# Patient Record
Sex: MALE | Race: WHITE | NOT HISPANIC OR LATINO | Employment: FULL TIME | URBAN - METROPOLITAN AREA
[De-identification: names, ages, dates, MRNs, and addresses within clinical notes are randomized per-mention and may not be internally consistent; named-entity substitution may affect disease eponyms.]

---

## 2017-01-04 ENCOUNTER — OFFICE VISIT (OUTPATIENT)
Dept: SURGERY | Facility: CLINIC | Age: 62
End: 2017-01-04

## 2017-01-04 VITALS
HEIGHT: 69 IN | DIASTOLIC BLOOD PRESSURE: 80 MMHG | WEIGHT: 172.6 LBS | SYSTOLIC BLOOD PRESSURE: 138 MMHG | BODY MASS INDEX: 25.56 KG/M2 | RESPIRATION RATE: 20 BRPM | HEART RATE: 69 BPM | OXYGEN SATURATION: 99 %

## 2017-01-04 DIAGNOSIS — Z09 FOLLOW UP: Primary | ICD-10-CM

## 2017-01-04 PROCEDURE — 99024 POSTOP FOLLOW-UP VISIT: CPT | Performed by: SURGERY

## 2017-01-04 NOTE — LETTER
January 4, 2017     Carlos Wu MD  [address] [address]    Patient: Adonis Tavares   YOB: 1955   Date of Visit: 1/4/2017       Dear Dr. Jazmin MD:    Thank you for referring Adonis Tavares to me for evaluation. Below are the relevant portions of my assessment and plan of care.                   If you have questions, please do not hesitate to call me. I look forward to following Adonis along with you.         Sincerely,        Simona Pena MD        CC: No Recipients

## 2017-01-04 NOTE — MR AVS SNAPSHOT
"                        Adonis Tavares   1/4/2017 11:20 AM   Office Visit    Dept Phone:  [phone]   Encounter #:  [id number]    Provider:  Simona Pena MD   Department:  [facility] GENERAL SURGERY                Your Full Care Plan              Your Updated Medication List      Notice  As of 1/4/2017 12:58 PM    You have not been prescribed any medications.            Instructions     None    Patient Instructions History      Upcoming Appointments     Visit Type Date Time Department    POST-OP 1/4/2017 11:20 AM [facility]      MyChart Signup     Our records indicate that you have declined [facility] [employer] signup. If you would like to sign up for [employer]t, please email [email or web address] or call [phone] to obtain an activation code.             Other Info from Your Visit           Allergies     No Known Allergies      Reason for Visit     Post-op hernia      Vital Signs     Blood Pressure Pulse Respirations Height Weight Oxygen Saturation    138/80 (BP Location: Left arm, Patient Position: Sitting, Cuff Size: Adult) 69 20 69\" (175.3 cm) 172 lb 9.6 oz (78.3 kg) 99%    Body Mass Index Smoking Status                25.49 kg/m2 Never Smoker            "

## 2017-01-04 NOTE — PROGRESS NOTES
Chief complaint:  Post-op  Follow up    History of Present Illness    This is Adonis Tavares 61 y.o. status post laparoscopic right inguinal hernia repair and open umbilical hernia repair and is doing very well.  Patient denies fever, chills, nausea or vomiting.  Patient's pain is well-controlled.      The following portions of the patient's history were reviewed and updated as appropriate: allergies, current medications, past family history, past medical history, past social history, past surgical history and problem list.    Physical Exam  Incision is well-healed without evidence of infection, herniation or seroma.    Adonis was seen today for post-op.    Diagnoses and all orders for this visit:    Follow up      This is Adonis Tavares 61 y.o. status post laparoscopic right inguinal hernia repair and open umbilical hernia repair and is doing very well.  I have instructed the patient not lift greater than 10 pounds for total of 6 weeks from the time of surgery. I have instructed the patient follow-up as needed.    Simona Pena MD  General, Minimally Invasive and Endoscopic Surgery  [facility]    [address], [address]  [place], [place], [address]  P: [phone]  F: [phone]    Cc:  Carlos Wu MD

## 2017-12-20 ENCOUNTER — APPOINTMENT (OUTPATIENT)
Dept: PREADMISSION TESTING | Facility: HOSPITAL | Age: 62
End: 2017-12-20

## 2017-12-20 VITALS
OXYGEN SATURATION: 100 % | RESPIRATION RATE: 20 BRPM | HEART RATE: 72 BPM | WEIGHT: 179.6 LBS | HEIGHT: 69 IN | TEMPERATURE: 97.9 F | SYSTOLIC BLOOD PRESSURE: 137 MMHG | BODY MASS INDEX: 26.6 KG/M2 | DIASTOLIC BLOOD PRESSURE: 85 MMHG

## 2017-12-20 LAB
ALBUMIN SERPL-MCNC: 4.5 G/DL (ref 3.5–5.2)
ALBUMIN/GLOB SERPL: 1.8 G/DL
ALP SERPL-CCNC: 58 U/L (ref 39–117)
ALT SERPL W P-5'-P-CCNC: 19 U/L (ref 1–41)
ANION GAP SERPL CALCULATED.3IONS-SCNC: 11.7 MMOL/L
AST SERPL-CCNC: 18 U/L (ref 1–40)
BACTERIA UR QL AUTO: NORMAL /HPF
BILIRUB SERPL-MCNC: 0.8 MG/DL (ref 0.1–1.2)
BILIRUB UR QL STRIP: NEGATIVE
BUN BLD-MCNC: 22 MG/DL (ref 8–23)
BUN/CREAT SERPL: 21 (ref 7–25)
CALCIUM SPEC-SCNC: 9.3 MG/DL (ref 8.6–10.5)
CHLORIDE SERPL-SCNC: 104 MMOL/L (ref 98–107)
CLARITY UR: CLEAR
CO2 SERPL-SCNC: 26.3 MMOL/L (ref 22–29)
COLOR UR: YELLOW
CREAT BLD-MCNC: 1.05 MG/DL (ref 0.76–1.27)
DEPRECATED RDW RBC AUTO: 47 FL (ref 37–54)
EOSINOPHIL # BLD MANUAL: 0.13 10*3/MM3 (ref 0–0.7)
EOSINOPHIL NFR BLD MANUAL: 2 % (ref 0.3–6.2)
ERYTHROCYTE [DISTWIDTH] IN BLOOD BY AUTOMATED COUNT: 12.9 % (ref 11.5–14.5)
GFR SERPL CREATININE-BSD FRML MDRD: 72 ML/MIN/1.73
GLOBULIN UR ELPH-MCNC: 2.5 GM/DL
GLUCOSE BLD-MCNC: 110 MG/DL (ref 65–99)
GLUCOSE UR STRIP-MCNC: NEGATIVE MG/DL
HCT VFR BLD AUTO: 44.8 % (ref 40.4–52.2)
HGB BLD-MCNC: 14.3 G/DL (ref 13.7–17.6)
HGB UR QL STRIP.AUTO: NEGATIVE
HYALINE CASTS UR QL AUTO: NORMAL /LPF
KETONES UR QL STRIP: NEGATIVE
LEUKOCYTE ESTERASE UR QL STRIP.AUTO: NEGATIVE
LYMPHOCYTES # BLD MANUAL: 1.71 10*3/MM3 (ref 0.9–4.8)
LYMPHOCYTES NFR BLD MANUAL: 12 % (ref 5–12)
LYMPHOCYTES NFR BLD MANUAL: 27 % (ref 19.6–45.3)
MCH RBC QN AUTO: 31.8 PG (ref 27–32.7)
MCHC RBC AUTO-ENTMCNC: 31.9 G/DL (ref 32.6–36.4)
MCV RBC AUTO: 99.8 FL (ref 79.8–96.2)
MONOCYTES # BLD AUTO: 0.76 10*3/MM3 (ref 0.2–1.2)
NEUTROPHILS # BLD AUTO: 3.74 10*3/MM3 (ref 1.9–8.1)
NEUTROPHILS NFR BLD MANUAL: 59 % (ref 42.7–76)
NITRITE UR QL STRIP: NEGATIVE
PH UR STRIP.AUTO: 5.5 [PH] (ref 5–8)
PLAT MORPH BLD: NORMAL
PLATELET # BLD AUTO: 155 10*3/MM3 (ref 140–500)
PMV BLD AUTO: 11.3 FL (ref 6–12)
POTASSIUM BLD-SCNC: 4.7 MMOL/L (ref 3.5–5.2)
PROT SERPL-MCNC: 7 G/DL (ref 6–8.5)
PROT UR QL STRIP: NEGATIVE
RBC # BLD AUTO: 4.49 10*6/MM3 (ref 4.6–6)
RBC # UR: NORMAL /HPF
RBC MORPH BLD: NORMAL
REF LAB TEST METHOD: NORMAL
SODIUM BLD-SCNC: 142 MMOL/L (ref 136–145)
SP GR UR STRIP: 1.02 (ref 1–1.03)
SQUAMOUS #/AREA URNS HPF: NORMAL /HPF
UROBILINOGEN UR QL STRIP: NORMAL
WBC MORPH BLD: NORMAL
WBC NRBC COR # BLD: 6.34 10*3/MM3 (ref 4.5–10.7)
WBC UR QL AUTO: NORMAL /HPF

## 2017-12-20 PROCEDURE — 36415 COLL VENOUS BLD VENIPUNCTURE: CPT

## 2017-12-20 PROCEDURE — 85007 BL SMEAR W/DIFF WBC COUNT: CPT | Performed by: ORTHOPAEDIC SURGERY

## 2017-12-20 PROCEDURE — 93005 ELECTROCARDIOGRAM TRACING: CPT

## 2017-12-20 PROCEDURE — 81001 URINALYSIS AUTO W/SCOPE: CPT | Performed by: ORTHOPAEDIC SURGERY

## 2017-12-20 PROCEDURE — 93010 ELECTROCARDIOGRAM REPORT: CPT | Performed by: INTERNAL MEDICINE

## 2017-12-20 PROCEDURE — 80053 COMPREHEN METABOLIC PANEL: CPT | Performed by: ORTHOPAEDIC SURGERY

## 2017-12-20 PROCEDURE — 85027 COMPLETE CBC AUTOMATED: CPT | Performed by: ORTHOPAEDIC SURGERY

## 2017-12-22 ENCOUNTER — ANESTHESIA EVENT (OUTPATIENT)
Dept: PERIOP | Facility: HOSPITAL | Age: 62
End: 2017-12-22

## 2017-12-22 ENCOUNTER — ANESTHESIA (OUTPATIENT)
Dept: PERIOP | Facility: HOSPITAL | Age: 62
End: 2017-12-22

## 2017-12-22 ENCOUNTER — HOSPITAL ENCOUNTER (OUTPATIENT)
Facility: HOSPITAL | Age: 62
Setting detail: HOSPITAL OUTPATIENT SURGERY
Discharge: HOME | End: 2017-12-22
Attending: ORTHOPAEDIC SURGERY | Admitting: ORTHOPAEDIC SURGERY

## 2017-12-22 VITALS
RESPIRATION RATE: 16 BRPM | DIASTOLIC BLOOD PRESSURE: 88 MMHG | OXYGEN SATURATION: 95 % | SYSTOLIC BLOOD PRESSURE: 126 MMHG | HEART RATE: 62 BPM | TEMPERATURE: 97.5 F

## 2017-12-22 PROCEDURE — C1713 ANCHOR/SCREW BN/BN,TIS/BN: HCPCS | Performed by: ORTHOPAEDIC SURGERY

## 2017-12-22 PROCEDURE — 25010000002 DEXAMETHASONE PER 1 MG: Performed by: NURSE ANESTHETIST, CERTIFIED REGISTERED

## 2017-12-22 PROCEDURE — L3670 SO ACRO/CLAV CAN WEB PRE OTS: HCPCS | Performed by: ORTHOPAEDIC SURGERY

## 2017-12-22 PROCEDURE — 25010000002 PROPOFOL 10 MG/ML EMULSION: Performed by: NURSE ANESTHETIST, CERTIFIED REGISTERED

## 2017-12-22 PROCEDURE — 25010000002 ONDANSETRON PER 1 MG: Performed by: NURSE ANESTHETIST, CERTIFIED REGISTERED

## 2017-12-22 PROCEDURE — 25010000002 MIDAZOLAM PER 1 MG: Performed by: ANESTHESIOLOGY

## 2017-12-22 PROCEDURE — 25010000003 CEFAZOLIN IN DEXTROSE 2-4 GM/100ML-% SOLUTION: Performed by: ORTHOPAEDIC SURGERY

## 2017-12-22 PROCEDURE — 25010000002 EPINEPHRINE PER 0.1 MG: Performed by: ORTHOPAEDIC SURGERY

## 2017-12-22 DEVICE — IMPLANTABLE DEVICE
Type: IMPLANTABLE DEVICE | Site: SHOULDER | Status: FUNCTIONAL
Brand: QUATTRO® LINK SP KNOTLESS ANCHOR

## 2017-12-22 DEVICE — IMPLANTABLE DEVICE
Type: IMPLANTABLE DEVICE | Site: SHOULDER | Status: FUNCTIONAL
Brand: BIOWICK® SURELOCK®

## 2017-12-22 RX ORDER — ONDANSETRON 2 MG/ML
INJECTION INTRAMUSCULAR; INTRAVENOUS AS NEEDED
Status: DISCONTINUED | OUTPATIENT
Start: 2017-12-22 | End: 2017-12-22 | Stop reason: SURG

## 2017-12-22 RX ORDER — FENTANYL CITRATE 50 UG/ML
50 INJECTION, SOLUTION INTRAMUSCULAR; INTRAVENOUS
Status: DISCONTINUED | OUTPATIENT
Start: 2017-12-22 | End: 2017-12-22 | Stop reason: HOSPADM

## 2017-12-22 RX ORDER — SODIUM CHLORIDE, SODIUM LACTATE, POTASSIUM CHLORIDE, AND CALCIUM CHLORIDE .6; .31; .03; .02 G/100ML; G/100ML; G/100ML; G/100ML
INJECTION, SOLUTION INTRAVENOUS AS NEEDED
Status: DISCONTINUED | OUTPATIENT
Start: 2017-12-22 | End: 2017-12-22 | Stop reason: HOSPADM

## 2017-12-22 RX ORDER — LABETALOL HYDROCHLORIDE 5 MG/ML
5 INJECTION, SOLUTION INTRAVENOUS
Status: DISCONTINUED | OUTPATIENT
Start: 2017-12-22 | End: 2017-12-22 | Stop reason: HOSPADM

## 2017-12-22 RX ORDER — SODIUM CHLORIDE, SODIUM LACTATE, POTASSIUM CHLORIDE, CALCIUM CHLORIDE 600; 310; 30; 20 MG/100ML; MG/100ML; MG/100ML; MG/100ML
9 INJECTION, SOLUTION INTRAVENOUS CONTINUOUS
Status: DISCONTINUED | OUTPATIENT
Start: 2017-12-22 | End: 2017-12-22 | Stop reason: HOSPADM

## 2017-12-22 RX ORDER — FAMOTIDINE 10 MG/ML
20 INJECTION, SOLUTION INTRAVENOUS ONCE
Status: COMPLETED | OUTPATIENT
Start: 2017-12-22 | End: 2017-12-22

## 2017-12-22 RX ORDER — EPHEDRINE SULFATE 50 MG/ML
INJECTION, SOLUTION INTRAVENOUS AS NEEDED
Status: DISCONTINUED | OUTPATIENT
Start: 2017-12-22 | End: 2017-12-22 | Stop reason: SURG

## 2017-12-22 RX ORDER — DEXAMETHASONE SODIUM PHOSPHATE 10 MG/ML
INJECTION INTRAMUSCULAR; INTRAVENOUS AS NEEDED
Status: DISCONTINUED | OUTPATIENT
Start: 2017-12-22 | End: 2017-12-22 | Stop reason: SURG

## 2017-12-22 RX ORDER — CEFAZOLIN SODIUM 2 G/100ML
2 INJECTION, SOLUTION INTRAVENOUS ONCE
Status: COMPLETED | OUTPATIENT
Start: 2017-12-22 | End: 2017-12-22

## 2017-12-22 RX ORDER — MIDAZOLAM HYDROCHLORIDE 1 MG/ML
1 INJECTION INTRAMUSCULAR; INTRAVENOUS
Status: DISCONTINUED | OUTPATIENT
Start: 2017-12-22 | End: 2017-12-22 | Stop reason: HOSPADM

## 2017-12-22 RX ORDER — PROMETHAZINE HYDROCHLORIDE 25 MG/1
12.5 TABLET ORAL ONCE AS NEEDED
Status: DISCONTINUED | OUTPATIENT
Start: 2017-12-22 | End: 2017-12-22 | Stop reason: HOSPADM

## 2017-12-22 RX ORDER — BUPIVACAINE HYDROCHLORIDE AND EPINEPHRINE 5; 5 MG/ML; UG/ML
INJECTION, SOLUTION PERINEURAL AS NEEDED
Status: DISCONTINUED | OUTPATIENT
Start: 2017-12-22 | End: 2017-12-22 | Stop reason: HOSPADM

## 2017-12-22 RX ORDER — MIDAZOLAM HYDROCHLORIDE 1 MG/ML
2 INJECTION INTRAMUSCULAR; INTRAVENOUS
Status: DISCONTINUED | OUTPATIENT
Start: 2017-12-22 | End: 2017-12-22 | Stop reason: HOSPADM

## 2017-12-22 RX ORDER — EPHEDRINE SULFATE 50 MG/ML
5 INJECTION, SOLUTION INTRAVENOUS ONCE AS NEEDED
Status: DISCONTINUED | OUTPATIENT
Start: 2017-12-22 | End: 2017-12-22 | Stop reason: HOSPADM

## 2017-12-22 RX ORDER — PROPOFOL 10 MG/ML
VIAL (ML) INTRAVENOUS AS NEEDED
Status: DISCONTINUED | OUTPATIENT
Start: 2017-12-22 | End: 2017-12-22 | Stop reason: SURG

## 2017-12-22 RX ORDER — HYDROCODONE BITARTRATE AND ACETAMINOPHEN 7.5; 325 MG/1; MG/1
1 TABLET ORAL ONCE AS NEEDED
Status: DISCONTINUED | OUTPATIENT
Start: 2017-12-22 | End: 2017-12-22 | Stop reason: HOSPADM

## 2017-12-22 RX ORDER — SODIUM CHLORIDE 0.9 % (FLUSH) 0.9 %
1-10 SYRINGE (ML) INJECTION AS NEEDED
Status: DISCONTINUED | OUTPATIENT
Start: 2017-12-22 | End: 2017-12-22 | Stop reason: HOSPADM

## 2017-12-22 RX ORDER — ONDANSETRON 2 MG/ML
4 INJECTION INTRAMUSCULAR; INTRAVENOUS ONCE AS NEEDED
Status: DISCONTINUED | OUTPATIENT
Start: 2017-12-22 | End: 2017-12-22 | Stop reason: HOSPADM

## 2017-12-22 RX ORDER — LIDOCAINE HYDROCHLORIDE 20 MG/ML
INJECTION, SOLUTION INFILTRATION; PERINEURAL AS NEEDED
Status: DISCONTINUED | OUTPATIENT
Start: 2017-12-22 | End: 2017-12-22 | Stop reason: SURG

## 2017-12-22 RX ORDER — PROMETHAZINE HYDROCHLORIDE 25 MG/ML
5 INJECTION, SOLUTION INTRAMUSCULAR; INTRAVENOUS
Status: DISCONTINUED | OUTPATIENT
Start: 2017-12-22 | End: 2017-12-22 | Stop reason: HOSPADM

## 2017-12-22 RX ORDER — DIPHENHYDRAMINE HYDROCHLORIDE 50 MG/ML
12.5 INJECTION INTRAMUSCULAR; INTRAVENOUS
Status: DISCONTINUED | OUTPATIENT
Start: 2017-12-22 | End: 2017-12-22 | Stop reason: HOSPADM

## 2017-12-22 RX ORDER — PROMETHAZINE HYDROCHLORIDE 25 MG/ML
12.5 INJECTION, SOLUTION INTRAMUSCULAR; INTRAVENOUS ONCE AS NEEDED
Status: DISCONTINUED | OUTPATIENT
Start: 2017-12-22 | End: 2017-12-22 | Stop reason: HOSPADM

## 2017-12-22 RX ORDER — HYDRALAZINE HYDROCHLORIDE 20 MG/ML
5 INJECTION INTRAMUSCULAR; INTRAVENOUS
Status: DISCONTINUED | OUTPATIENT
Start: 2017-12-22 | End: 2017-12-22 | Stop reason: HOSPADM

## 2017-12-22 RX ORDER — PROMETHAZINE HYDROCHLORIDE 25 MG/1
25 SUPPOSITORY RECTAL ONCE AS NEEDED
Status: DISCONTINUED | OUTPATIENT
Start: 2017-12-22 | End: 2017-12-22 | Stop reason: HOSPADM

## 2017-12-22 RX ORDER — NALOXONE HCL 0.4 MG/ML
0.2 VIAL (ML) INJECTION AS NEEDED
Status: DISCONTINUED | OUTPATIENT
Start: 2017-12-22 | End: 2017-12-22 | Stop reason: HOSPADM

## 2017-12-22 RX ORDER — ROCURONIUM BROMIDE 10 MG/ML
INJECTION, SOLUTION INTRAVENOUS AS NEEDED
Status: DISCONTINUED | OUTPATIENT
Start: 2017-12-22 | End: 2017-12-22 | Stop reason: SURG

## 2017-12-22 RX ORDER — FLUMAZENIL 0.1 MG/ML
0.2 INJECTION INTRAVENOUS AS NEEDED
Status: DISCONTINUED | OUTPATIENT
Start: 2017-12-22 | End: 2017-12-22 | Stop reason: HOSPADM

## 2017-12-22 RX ORDER — PROMETHAZINE HYDROCHLORIDE 25 MG/1
25 TABLET ORAL ONCE AS NEEDED
Status: DISCONTINUED | OUTPATIENT
Start: 2017-12-22 | End: 2017-12-22 | Stop reason: HOSPADM

## 2017-12-22 RX ADMIN — PROPOFOL 200 MG: 10 INJECTION, EMULSION INTRAVENOUS at 10:32

## 2017-12-22 RX ADMIN — LIDOCAINE HYDROCHLORIDE 100 MG: 20 INJECTION, SOLUTION INFILTRATION; PERINEURAL at 10:32

## 2017-12-22 RX ADMIN — ROCURONIUM BROMIDE 40 MG: 10 INJECTION INTRAVENOUS at 10:32

## 2017-12-22 RX ADMIN — Medication 2 MG: at 09:06

## 2017-12-22 RX ADMIN — Medication 2 MG: at 09:11

## 2017-12-22 RX ADMIN — SUGAMMADEX 200 MG: 100 INJECTION, SOLUTION INTRAVENOUS at 11:50

## 2017-12-22 RX ADMIN — SODIUM CHLORIDE, POTASSIUM CHLORIDE, SODIUM LACTATE AND CALCIUM CHLORIDE 9 ML/HR: 600; 310; 30; 20 INJECTION, SOLUTION INTRAVENOUS at 09:05

## 2017-12-22 RX ADMIN — DEXAMETHASONE SODIUM PHOSPHATE 8 MG: 10 INJECTION INTRAMUSCULAR; INTRAVENOUS at 10:42

## 2017-12-22 RX ADMIN — CEFAZOLIN SODIUM 2 G: 2 INJECTION, SOLUTION INTRAVENOUS at 10:30

## 2017-12-22 RX ADMIN — EPHEDRINE SULFATE 10 MG: 50 INJECTION INTRAMUSCULAR; INTRAVENOUS; SUBCUTANEOUS at 11:05

## 2017-12-22 RX ADMIN — EPHEDRINE SULFATE 10 MG: 50 INJECTION INTRAMUSCULAR; INTRAVENOUS; SUBCUTANEOUS at 10:50

## 2017-12-22 RX ADMIN — FAMOTIDINE 20 MG: 10 INJECTION, SOLUTION INTRAVENOUS at 09:05

## 2017-12-22 RX ADMIN — EPHEDRINE SULFATE 5 MG: 50 INJECTION INTRAMUSCULAR; INTRAVENOUS; SUBCUTANEOUS at 11:20

## 2017-12-22 RX ADMIN — SODIUM CHLORIDE, POTASSIUM CHLORIDE, SODIUM LACTATE AND CALCIUM CHLORIDE: 600; 310; 30; 20 INJECTION, SOLUTION INTRAVENOUS at 11:10

## 2017-12-22 RX ADMIN — ONDANSETRON 4 MG: 2 INJECTION INTRAMUSCULAR; INTRAVENOUS at 11:11

## 2017-12-22 NOTE — ANESTHESIA PROCEDURE NOTES
Airway  Urgency: elective    Airway not difficult    General Information and Staff    Patient location during procedure: OR  Anesthesiologist: RENDER, SUMAN RAY  CRNA: BRANDAN COOK    Indications and Patient Condition  Indications for airway management: airway protection    Preoxygenated: yes  MILS maintained throughout  Mask difficulty assessment: 1 - vent by mask    Final Airway Details  Final airway type: endotracheal airway      Successful airway: ETT    Successful intubation technique: direct laryngoscopy  Facilitating devices/methods: intubating stylet  Endotracheal tube insertion site: oral  Blade: Love  Blade size: #2  ETT size: 7.0 mm  Cormack-Lehane Classification: grade I - full view of glottis  Placement verified by: chest auscultation and capnometry   Measured from: lips  Number of attempts at approach: 1    Additional Comments  Smooth iv induction with no complications

## 2017-12-22 NOTE — PLAN OF CARE
Problem: Perioperative Period (Adult)  Goal: Signs and Symptoms of Listed Potential Problems Will be Absent or Manageable (Perioperative Period)  Outcome: Ongoing (interventions implemented as appropriate)   12/22/17 1356   Perioperative Period   Problems Assessed (Perioperative Period) pain;wound complications;embolism;hemorrhage;hypothermia;hypoxia/hypoxemia   Problems Present (Perioperative Period) none

## 2017-12-22 NOTE — PLAN OF CARE
Problem: Patient Care Overview (Adult)  Goal: Plan of Care Review  Outcome: Ongoing (interventions implemented as appropriate)   12/22/17 7277   Coping/Psychosocial Response Interventions   Plan Of Care Reviewed With patient   Patient Care Overview   Progress improving     Goal: Adult Individualization and Mutuality  Outcome: Ongoing (interventions implemented as appropriate)    Goal: Discharge Needs Assessment  Outcome: Ongoing (interventions implemented as appropriate)

## 2017-12-22 NOTE — ANESTHESIA PREPROCEDURE EVALUATION
Anesthesia Evaluation     Patient summary reviewed and Nursing notes reviewed          Airway   Mallampati: II  TM distance: >3 FB  Neck ROM: full  no difficulty expected  Dental - normal exam     Pulmonary - negative pulmonary ROS and normal exam   Cardiovascular - negative cardio ROS and normal exam  Exercise tolerance: good (4-7 METS)    Rhythm: regular  Rate: normal        Neuro/Psych- negative ROS  GI/Hepatic/Renal/Endo      Musculoskeletal (-) negative ROS    Abdominal  - normal exam   Substance History - negative use     OB/GYN          Other - negative ROS                                       Anesthesia Plan    ASA 1     general and regional     intravenous induction   Anesthetic plan and risks discussed with patient.

## 2017-12-22 NOTE — ANESTHESIA POSTPROCEDURE EVALUATION
Patient: Adonis Tavares    Procedure Summary     Date Anesthesia Start Anesthesia Stop Room / Location    12/22/17 1027 0495  [facility] OR [facility] /  [facility]       Procedure Diagnosis Surgeon Provider    LT SHOULDER ARTHROSCOPY WITH ROTATOR CUFF REPAIR SUBACROMIAL DECOMPRESSION BICEPS TENOTOMY (Left Shoulder) No diagnosis on file. MD Froylan Alva MD          Anesthesia Type: general, regional  Last vitals  BP   126/88 (12/22/17 1315)   Temp   36.4 °C (97.5 °F) (12/22/17 1305)   Pulse   62 (12/22/17 1315)   Resp   16 (12/22/17 1315)     SpO2   95 % (12/22/17 1315)     Post Anesthesia Care and Evaluation    Patient location during evaluation: PACU  Patient participation: complete - patient participated  Level of consciousness: awake and alert  Pain management: adequate  Airway patency: patent  Anesthetic complications: No anesthetic complications    Cardiovascular status: acceptable  Respiratory status: acceptable  Hydration status: acceptable

## 2018-01-22 ENCOUNTER — HOSPITAL ENCOUNTER (OUTPATIENT)
Dept: PHYSICAL THERAPY | Facility: HOSPITAL | Age: 63
Setting detail: THERAPIES SERIES
Discharge: HOME | End: 2018-01-22

## 2018-01-22 DIAGNOSIS — Z98.890 S/P ROTATOR CUFF REPAIR: Primary | ICD-10-CM

## 2018-01-22 PROCEDURE — 97161 PT EVAL LOW COMPLEX 20 MIN: CPT

## 2018-01-22 NOTE — THERAPY EVALUATION
Outpatient Physical Therapy Ortho Initial Evaluation   Ignacia Mckeon     Patient Name: Adonis Tavares  : 1955  MRN: [id number]  Today's Date: 2018      Visit Date: 2018    There is no problem list on file for this patient.       Past Medical History:   Diagnosis Date   • Rotator cuff injury 2017    left        Past Surgical History:   Procedure Laterality Date   • ACHILLES TENDON SURGERY Right     DR WALTER   • COLONOSCOPY  2006   • HERNIA REPAIR  2017    RIGHT INGUINAL AND UMBILICAL - DR ADHIKARI   • SHOULDER ARTHROSCOPY W/ ROTATOR CUFF REPAIR Left 2017    Procedure: LT SHOULDER ARTHROSCOPY WITH ROTATOR CUFF REPAIR SUBACROMIAL DECOMPRESSION BICEPS TENOTOMY;  Surgeon: MIGUELINA Barnes MD;  [facility] OR [facility];  Service:    • SHOULDER ROTATOR CUFF REPAIR Right             Visit Dx:     ICD-10-CM ICD-9-CM   1. S/P rotator cuff repair Z98.890 V45.89             Patient History       18 0700          History    Chief Complaint Difficulty with daily activities;Joint stiffness;Muscle tenderness;Muscle weakness;Pain  -AS      Type of Pain Shoulder pain   Left  -AS      Date Current Problem(s) Began 17  -AS      Brief Description of Current Complaint Patient is s/p left RCR performed on 17. Patient is wearing abduction pillow sling at this time.   -AS      Onset Date- PT 18  -AS      Patient/Caregiver Goals Relieve pain;Return to prior level of function;Improve mobility;Improve strength  -AS      Patient's Rating of General Health Good  -AS      Hand Dominance right-handed  -AS      Occupation/sports/leisure /office work  -AS      How has patient tried to help current problem? rest, sling  -AS      Pain     Pain Location Shoulder  -AS      Pain at Present 0  -AS      Pain at Best 0  -AS      Pain at Worst 4  -AS      Pain Frequency Intermittent  -AS      Pain Description Aching  -AS      What Performance Factors Make the Current Problem(s)  WORSE? movement  -AS      What Performance Factors Make the Current Problem(s) BETTER? rest  -AS      Daily Activities    Primary Language English  -AS      How does patient learn best? Listening;Reading  -AS      Teaching needs identified Home Exercise Program;Management of Condition  -AS      Patient is concerned about/has problems with Difficulty with self care (i.e. bathing, dressing, toileting:;Performing home management (household chores, shopping, care of dependents);Performing job responsibilities/community activities (work, school,;Performing sports, recreation, and play activities;Reaching over head;Repetitive movements of the hand, arm, shoulder  -AS      Does patient have problems with the following? None  -AS      Barriers to learning None  -AS      Pt Participated in POC and Goals Yes  -AS      Safety    Are you being hurt, hit, or frightened by anyone at home or in your life? No  -AS      Are you being neglected by a caregiver No  -AS        User Key  (r) = Recorded By, (t) = Taken By, (c) = Cosigned By    Initials Name Provider Type    AS Tee Titus, PT Physical Therapist                PT Ortho       01/22/18 0700    Precautions and Contraindications    Precautions/Limitations shoulder precautions  -AS    Posture/Observations    Posture- WNL Posture is WNL  -AS    Observations Incision healing;Muscle atrophy;Ecchymosis/bruising  -AS    Shoulder Girdle Palpation    Supraspinatus Insertion Left:;Tender  -AS    Long Head of Biceps Left:;Tender  -AS    Greater Tubercule Left:;Tender  -AS    Right Shoulder    Flexion AROM Deficit --   Not taken at IE due to post-op restrictions  -AS    Flexion PROM Deficit 126  -AS    ABduction AROM Deficit --   Not taken at IE due to post-op restrictions  -AS    ABduction PROM Deficit 134  -AS    External Rotation AROM Deficit --   Not taken at IE due to post-op restrictions  -AS    External Rotation PROM Deficit 63  -AS    Internal Rotation AROM Deficit --    Not taken at IE due to post-op restrictions  -AS    Internal Rotation PROM Deficit 46  -AS    Left Elbow/Forearm    Extension/Flexion AROM WNL (0-180 degrees)  -AS    Left Shoulder    Flexion Gross Movement --   Not taken at IE due to post-op restrictions  -AS    ABduction Gross Movement --   Not taken at IE due to post-op restrictions  -AS    Int Rotation Gross Movement --   Not taken at IE due to post-op restrictions  -AS    Ext Rotation Gross Movement --   Not taken at IE due to post-op restrictions  -AS    Left Elbow/Forearm    Elbow Flexion Gross Movement (4/5) good  -AS    Elbow Extension Gross Movement (4/5) good  -AS    Left Wrist    Wrist Flexion Gross Movement (5/5) normal  -AS    Wrist Extension Gross Movement (5/5) normal  -AS      User Key  (r) = Recorded By, (t) = Taken By, (c) = Cosigned By    Initials Name Provider Type    AS Tee Titus, PT Physical Therapist                      Therapy Education  Given: HEP, Symptoms/condition management, Pain management  Program: New  How Provided: Verbal, Demonstration, Written  Provided to: Patient  Level of Understanding: Teach back education performed, Verbalized, Demonstrated           PT OP Goals       01/22/18 0700       PT Short Term Goals    STG Date to Achieve 02/12/18  -AS     STG 1 Patient to be compliant with his initial HEP for left shoulder ROM and strength.  -AS     STG 2 Patient to report left shoulder pain on VAS of 2-3/10 with PROM.  -AS     STG 3 Patient to demonstrate improved left shoulder PROM to within 15 degrees of his contralateral shoulder.  -AS     STG 4 Patient to demonstrate general left shoulder strength of 4-/5.  -AS     Long Term Goals    LTG Date to Achieve 03/05/18  -AS     LTG 1 Patient to be compliant with his advanced HEP for left shoulder ROM and strength.  -AS     LTG 2 Patient to report left shoulder pain on VAS of 0-1/10 with PROM and 1-2/10 with AROM.  -AS     LTG 3 Patient to demonstrate improved left  shoulder PROM to WNL in all planes of motion.  -AS     LTG 4 Patient to demonstrate improved left shoulder AROM to WNL in all planes of motion.  -AS     LTG 5 Patient to demonstrate general left shoulder strength of 4/5.  -AS     LTG 6 Patient to report improved function and decreased left shoulder pain on Quick DASH by >10-15 points.  -AS     Time Calculation    PT Goal Re-Cert Due Date 02/19/18  -AS       User Key  (r) = Recorded By, (t) = Taken By, (c) = Cosigned By    Initials Name Provider Type    AS Tee Titus, PT Physical Therapist                PT Assessment/Plan       01/22/18 0700       PT Assessment    Functional Limitations Limitation in home management;Limitations in community activities;Performance in leisure activities;Performance in self-care ADL;Performance in sport activities;Performance in work activities  -AS     Impairments Joint mobility;Muscle strength;Pain;Range of motion  -AS     Assessment Comments Patient presents to outpatient PT s/p left RCR. Patient is wearing abduction pillow sling at this time. Patient has limited left shoulder ROM, limited left shoulder and scapular strengthening, and increased left shoulder pain. Patient has decreased function due to the above.  -AS     Please refer to paper survey for additional self-reported information Yes  -AS     Rehab Potential Good  -AS     Patient/caregiver participated in establishment of treatment plan and goals Yes  -AS     Patient would benefit from skilled therapy intervention Yes  -AS     PT Plan    PT Frequency 2x/week  -AS     Predicted Duration of Therapy Intervention (days/wks) 8-10 weeks  -AS     Planned CPT's? PT RE-EVAL: 75454;PT THER PROC EA 15 MIN: 18827;PT THER ACT EA 15 MIN: 43439;PT MANUAL THERAPY EA 15 MIN: 43968;PT ELECTRICAL STIM UNATTEND: ;PT ULTRASOUND EA 15 MIN: 16924;PT HOT/COLD PACK WC NONMCARE: 66825;PT IONTOPHORESIS EA 15 MIN: 67542  -AS     Physical Therapy Interventions (Optional Details) --   20  min of manual therapy was performed today  -AS       User Key  (r) = Recorded By, (t) = Taken By, (c) = Cosigned By    Initials Name Provider Type    AS Tee Titus PT Physical Therapist                Modalities       01/22/18 0700          Moist Heat    MH Applied Yes  -AS      Location Left Shoulder  -AS      Rx Minutes 10 mins  -AS      MH Prior to Rx Yes  -AS      Ice    Ice Applied Yes  -AS      Location Left Shoulder  -AS      Rx Minutes 10 mins  -AS      Ice S/P Rx Yes  -AS        User Key  (r) = Recorded By, (t) = Taken By, (c) = Cosigned By    Initials Name Provider Type    AS Tee Titus PT Physical Therapist              Exercises       01/22/18 0700          Subjective Pain    Able to rate subjective pain? yes  -AS      Pre-Treatment Pain Level 0  -AS      Post-Treatment Pain Level 0  -AS        User Key  (r) = Recorded By, (t) = Taken By, (c) = Cosigned By    Initials Name Provider Type    AS Tee Titus PT Physical Therapist           Manual Rx (last 36 hours)      Manual Treatments       01/22/18 0700          Manual Rx 1    Manual Rx 1 Location Left Shoulder  -AS      Manual Rx 1 Type PROM - ER, IR, Flex, and ABD  -AS      Manual Rx 1 Grade Grade I/II  -AS      Manual Rx 1 Duration 20 min  -AS        User Key  (r) = Recorded By, (t) = Taken By, (c) = Cosigned By    Initials Name Provider Type    AS Tee Titus PT Physical Therapist                                Time Calculation:   Start Time: 0700  Stop Time: 0757  Time Calculation (min): 57 min     Therapy Charges for Today     Code Description Service Date Service Provider Modifiers Qty    88485546026  PT EVAL LOW COMPLEXITY 4 1/22/2018 Tee Titus, PT GP 1                    Tee Titus, PT  1/22/2018

## 2018-01-25 ENCOUNTER — HOSPITAL ENCOUNTER (OUTPATIENT)
Dept: PHYSICAL THERAPY | Facility: HOSPITAL | Age: 63
Setting detail: THERAPIES SERIES
Discharge: HOME | End: 2018-01-25

## 2018-01-25 DIAGNOSIS — Z98.890 S/P ROTATOR CUFF REPAIR: Primary | ICD-10-CM

## 2018-01-25 PROCEDURE — 97140 MANUAL THERAPY 1/> REGIONS: CPT

## 2018-01-25 NOTE — THERAPY TREATMENT NOTE
Outpatient Physical Therapy Ortho Treatment Note   Ignacia Mckeon     Patient Name: Adonis Tavares  : 1955  MRN: [id number]  Today's Date: 2018      Visit Date: 2018    Visit Dx:    ICD-10-CM ICD-9-CM   1. S/P rotator cuff repair Z98.890 V45.89       There is no problem list on file for this patient.       Past Medical History:   Diagnosis Date   • Rotator cuff injury 2017    left        Past Surgical History:   Procedure Laterality Date   • ACHILLES TENDON SURGERY Right     DR WALTER   • COLONOSCOPY  2006   • HERNIA REPAIR  2017    RIGHT INGUINAL AND UMBILICAL - DR ADHIKARI   • SHOULDER ARTHROSCOPY W/ ROTATOR CUFF REPAIR Left 2017    Procedure: LT SHOULDER ARTHROSCOPY WITH ROTATOR CUFF REPAIR SUBACROMIAL DECOMPRESSION BICEPS TENOTOMY;  Surgeon: MIGUELINA Barnes MD;  Location: [facility] OR [facility];  Service:    • SHOULDER ROTATOR CUFF REPAIR Right                                   PT Assessment/Plan       18 0800       PT Assessment    Assessment Comments Progressed patient with PROM today with wand exercises and pulleys. Patient tolerated treatment well today without complaints.  -AS     PT Plan    PT Plan Comments Continue with post-op protocol  -AS       User Key  (r) = Recorded By, (t) = Taken By, (c) = Cosigned By    Initials Name Provider Type    AS Tee Titus, PT Physical Therapist                Modalities       18 0800          Moist Heat    MH Applied Yes  -AS      Location Left Shoulder  -AS      Rx Minutes 10 mins  -AS      MH Prior to Rx Yes  -AS      Ice    Ice Applied Yes  -AS      Location Left Shoulder  -AS      Rx Minutes 10 mins  -AS      Ice S/P Rx Yes  -AS        User Key  (r) = Recorded By, (t) = Taken By, (c) = Cosigned By    Initials Name Provider Type    AS Tee Titus, PT Physical Therapist                Exercises       18 0800          Subjective Comments    Subjective Comments Patient states that his left shoulder was sore  but states that it is feeling better this morning.  -AS      Exercise 1    Exercise Name 1 3-Way Wand - Flex, ABD, ER  -AS      Reps 1 20  -AS      Time (Seconds) 1 5 sec hold each  -AS      Exercise 2    Exercise Name 2 Pulleys - Flex & ABD  -AS      Time (Minutes) 2 3 min each  -AS        User Key  (r) = Recorded By, (t) = Taken By, (c) = Cosigned By    Initials Name Provider Type    AS Tee Titus, PT Physical Therapist                        Manual Rx (last 36 hours)      Manual Treatments       01/25/18 0800          Manual Rx 1    Manual Rx 1 Location Left Shoulder  -AS      Manual Rx 1 Type PROM - ER, IR, Flex, and ABD  -AS      Manual Rx 1 Grade Grade I/II  -AS      Manual Rx 1 Duration 20 min  -AS        User Key  (r) = Recorded By, (t) = Taken By, (c) = Cosigned By    Initials Name Provider Type    AS Tee Titus, PT Physical Therapist                             Time Calculation:   Start Time: 0700  Stop Time: 0801  Time Calculation (min): 61 min    Therapy Charges for Today     Code Description Service Date Service Provider Modifiers Qty    28934833417  PT MANUAL THERAPY EA 15 MIN 1/25/2018 Tee Titus, PT GP 1                    Tee Titus, PT  1/25/2018

## 2018-01-29 ENCOUNTER — HOSPITAL ENCOUNTER (OUTPATIENT)
Dept: PHYSICAL THERAPY | Facility: HOSPITAL | Age: 63
Setting detail: THERAPIES SERIES
Discharge: HOME | End: 2018-01-29

## 2018-01-29 DIAGNOSIS — Z98.890 S/P ROTATOR CUFF REPAIR: Primary | ICD-10-CM

## 2018-01-29 PROCEDURE — 97140 MANUAL THERAPY 1/> REGIONS: CPT

## 2018-01-29 NOTE — THERAPY TREATMENT NOTE
Outpatient Physical Therapy Ortho Treatment Note   Ignacia Mckeon     Patient Name: Adonis Tavares  : 1955  MRN: [id number]  Today's Date: 2018      Visit Date: 2018    Visit Dx:    ICD-10-CM ICD-9-CM   1. S/P rotator cuff repair Z98.890 V45.89       There is no problem list on file for this patient.       Past Medical History:   Diagnosis Date   • Rotator cuff injury 2017    left        Past Surgical History:   Procedure Laterality Date   • ACHILLES TENDON SURGERY Right     DR WALTER   • COLONOSCOPY  2006   • HERNIA REPAIR  2017    RIGHT INGUINAL AND UMBILICAL - DR ADHIKARI   • SHOULDER ARTHROSCOPY W/ ROTATOR CUFF REPAIR Left 2017    Procedure: LT SHOULDER ARTHROSCOPY WITH ROTATOR CUFF REPAIR SUBACROMIAL DECOMPRESSION BICEPS TENOTOMY;  Surgeon: MIGUELINA Barnes MD;  Location: [facility] OR [facility];  Service:    • SHOULDER ROTATOR CUFF REPAIR Right                                   PT Assessment/Plan       18 0800       PT Assessment    Assessment Comments Continued with PROM exercises and manual therapy techniques today without complaints.   -AS     PT Plan    Physical Therapy Interventions (Optional Details) --   20 min of manual therapy were performed today  -AS     PT Plan Comments Continue with post-op protocol  -AS       User Key  (r) = Recorded By, (t) = Taken By, (c) = Cosigned By    Initials Name Provider Type    AS Tee Titus, PT Physical Therapist                Modalities       18 0800          Moist Heat    MH Applied Yes  -AS      Location Left Shoulder  -AS      Rx Minutes 10 mins  -AS      MH Prior to Rx Yes  -AS      Ice    Ice Applied Yes  -AS      Location Left Shoulder  -AS      Rx Minutes 10 mins  -AS      Ice S/P Rx Yes  -AS        User Key  (r) = Recorded By, (t) = Taken By, (c) = Cosigned By    Initials Name Provider Type    AS Tee Titus, PT Physical Therapist                Exercises       18 0800          Subjective Comments     Subjective Comments Patient states that he has been compliant with his HEP for ROM and states his shoulder is feeling good this morning.  -AS      Exercise 1    Exercise Name 1 3-Way Wand - Flex, ABD, ER  -AS      Reps 1 25  -AS      Time (Seconds) 1 5 sec hold each  -AS      Exercise 2    Exercise Name 2 Pulleys - Flex & ABD  -AS      Time (Minutes) 2 5 min each  -AS        User Key  (r) = Recorded By, (t) = Taken By, (c) = Cosigned By    Initials Name Provider Type    AS Tee Titus, PT Physical Therapist                        Manual Rx (last 36 hours)      Manual Treatments       01/29/18 0800          Manual Rx 1    Manual Rx 1 Location Left Shoulder  -AS      Manual Rx 1 Type PROM - ER, IR, Flex, and ABD  -AS      Manual Rx 1 Grade Grade I/II  -AS      Manual Rx 1 Duration 20 min  -AS        User Key  (r) = Recorded By, (t) = Taken By, (c) = Cosigned By    Initials Name Provider Type    AS Tee Titus, PT Physical Therapist                             Time Calculation:   Start Time: 0700  Stop Time: 0754  Time Calculation (min): 54 min    Therapy Charges for Today     Code Description Service Date Service Provider Modifiers Qty    [id number]  PT MANUAL THERAPY EA 15 MIN 1/29/2018 Tee Titus, PT GP 1                    Tee Titus, PT  1/29/2018

## 2018-02-01 ENCOUNTER — HOSPITAL ENCOUNTER (OUTPATIENT)
Dept: PHYSICAL THERAPY | Facility: HOSPITAL | Age: 63
Setting detail: THERAPIES SERIES
Discharge: HOME | End: 2018-02-01

## 2018-02-01 DIAGNOSIS — Z98.890 S/P ROTATOR CUFF REPAIR: Primary | ICD-10-CM

## 2018-02-01 PROCEDURE — 97140 MANUAL THERAPY 1/> REGIONS: CPT

## 2018-02-01 NOTE — THERAPY TREATMENT NOTE
Outpatient Physical Therapy Ortho Treatment Note   Ignacia Mckeon     Patient Name: Adonis Tavares  : 1955  MRN: [id number]  Today's Date: 2018      Visit Date: 2018    Visit Dx:    ICD-10-CM ICD-9-CM   1. S/P rotator cuff repair Z98.890 V45.89       There is no problem list on file for this patient.       Past Medical History:   Diagnosis Date   • Rotator cuff injury 2017    left        Past Surgical History:   Procedure Laterality Date   • ACHILLES TENDON SURGERY Right     DR WALTER   • COLONOSCOPY  2006   • HERNIA REPAIR  2017    RIGHT INGUINAL AND UMBILICAL - DR ADHIKARI   • SHOULDER ARTHROSCOPY W/ ROTATOR CUFF REPAIR Left 2017    Procedure: LT SHOULDER ARTHROSCOPY WITH ROTATOR CUFF REPAIR SUBACROMIAL DECOMPRESSION BICEPS TENOTOMY;  Surgeon: MIGUELINA Barnes MD;  [place] [facility] [place] [facility] [place]  Service:    • SHOULDER ROTATOR CUFF REPAIR Right                                   PT Assessment/Plan       18 0700       PT Assessment    Assessment Comments Initiated supine AROM to eliminate gravity. Patient tolerated this well and without complaints. Continued with PROM and manual therapy techniques today without complaints.  -AS     PT Plan    Physical Therapy Interventions (Optional Details) --   20 min of manual therapy performed today  -AS     PT Plan Comments Continue with post-op protocol  -AS       User Key  (r) = Recorded By, (t) = Taken By, (c) = Cosigned By    Initials Name Provider Type    AS Tee Titus, PT Physical Therapist                Modalities       18 0700          Moist Heat    MH Applied Yes  -AS      Location Left Shoulder  -AS      Rx Minutes 10 mins  -AS      MH Prior to Rx Yes  -AS      Ice    Ice Applied Yes  -AS      Location Left Shoulder  -AS      Rx Minutes 10 mins  -AS      Ice S/P Rx Yes  -AS        User Key  (r) = Recorded By, (t) = Taken By, (c) = Cosigned By    Initials Name Provider Type    AS Tee Titus, PT Physical  Therapist                Exercises       02/01/18 0700          Subjective Comments    Subjective Comments Patient states that he went to his MD the other day and the sling has been D/C and he has been approved to initiate AROM at this time.  -AS      Exercise 1    Exercise Name 1 3-Way Wand - Flex, ABD, ER  -AS      Reps 1 30  -AS      Time (Seconds) 1 5 sec hold each  -AS      Exercise 2    Exercise Name 2 Pulleys - Flex & ABD  -AS      Time (Minutes) 2 5 min each  -AS      Exercise 3    Exercise Name 3 Supine AROM for Flexion  -AS      Reps 3 25  -AS        User Key  (r) = Recorded By, (t) = Taken By, (c) = Cosigned By    Initials Name Provider Type    AS Tee Titus, PT Physical Therapist                        Manual Rx (last 36 hours)      Manual Treatments       02/01/18 0700          Manual Rx 1    Manual Rx 1 Location Left Shoulder  -AS      Manual Rx 1 Type PROM - ER, IR, Flex, and ABD  -AS      Manual Rx 1 Grade Grade I/II  -AS      Manual Rx 1 Duration 20 min  -AS        User Key  (r) = Recorded By, (t) = Taken By, (c) = Cosigned By    Initials Name Provider Type    AS Tee Titus, PT Physical Therapist                             Time Calculation:   Start Time: 0657  Stop Time: 0756  Time Calculation (min): 59 min    Therapy Charges for Today     Code Description Service Date Service Provider Modifiers Qty    [id number]  PT MANUAL THERAPY EA 15 MIN 2/1/2018 Tee Titus, PT GP 1                    Tee Titus, PT  2/1/2018

## 2018-02-05 ENCOUNTER — HOSPITAL ENCOUNTER (OUTPATIENT)
Dept: PHYSICAL THERAPY | Facility: HOSPITAL | Age: 63
Setting detail: THERAPIES SERIES
Discharge: HOME | End: 2018-02-05

## 2018-02-05 DIAGNOSIS — Z98.890 S/P ROTATOR CUFF REPAIR: Primary | ICD-10-CM

## 2018-02-05 PROCEDURE — 97140 MANUAL THERAPY 1/> REGIONS: CPT

## 2018-02-05 NOTE — THERAPY TREATMENT NOTE
Outpatient Physical Therapy Ortho Treatment Note   Ignacia Mckeon     Patient Name: Adonis Tavares  : 1955  MRN: [id number]  Today's Date: 2018      Visit Date: 2018    Visit Dx:    ICD-10-CM ICD-9-CM   1. S/P rotator cuff repair Z98.890 V45.89       There is no problem list on file for this patient.       Past Medical History:   Diagnosis Date   • Rotator cuff injury 2017    left        Past Surgical History:   Procedure Laterality Date   • ACHILLES TENDON SURGERY Right     DR WALTER   • COLONOSCOPY  2006   • HERNIA REPAIR  2017    RIGHT INGUINAL AND UMBILICAL - DR ADHIKARI   • SHOULDER ARTHROSCOPY W/ ROTATOR CUFF REPAIR Left 2017    Procedure: LT SHOULDER ARTHROSCOPY WITH ROTATOR CUFF REPAIR SUBACROMIAL DECOMPRESSION BICEPS TENOTOMY;  Surgeon: MIGUELINA Barnes MD;  Location: [facility] OR [facility] [place];  Service:    • SHOULDER ROTATOR CUFF REPAIR Right                                   PT Assessment/Plan       18 0700       PT Assessment    Assessment Comments Initiated gentle RC strengthening this morning without complaints of increased left shoulder pain. Plan to slowly introduce more strengthening exercises over next few visits. Patient is tolerating treatment very well at this time.  -AS     PT Plan    Physical Therapy Interventions (Optional Details) --   20 min of manual therapy performed today  -AS     PT Plan Comments Continue with post-op protocol  -AS       User Key  (r) = Recorded By, (t) = Taken By, (c) = Cosigned By    Initials Name Provider Type    AS Tee Titus, PT Physical Therapist                Modalities       18 0700          Moist Heat    MH Applied Yes  -AS      Location Left Shoulder  -AS      Rx Minutes 10 mins  -AS      MH Prior to Rx Yes  -AS      Ice    Ice Applied Yes  -AS      Location Left Shoulder  -AS      Rx Minutes 10 mins  -AS      Ice S/P Rx Yes  -AS        User Key  (r) = Recorded By, (t) = Taken By, (c) = Cosigned By    Initials Name  Provider Type    AS Tee Titus, PT Physical Therapist                Exercises       02/05/18 0700          Subjective Comments    Subjective Comments Patient states that his left shoulder is feeling good this morning.  -AS      Exercise 1    Exercise Name 1 3-Way Wand - Flex, ABD, ER  -AS      Reps 1 30  -AS      Time (Seconds) 1 5 sec hold each  -AS      Exercise 2    Exercise Name 2 Pulleys - Flex & ABD  -AS      Time (Minutes) 2 5 min each  -AS      Exercise 3    Exercise Name 3 Supine AROM for Flexion  -AS      Reps 3 25  -AS      Exercise 4    Exercise Name 4 Shoulder IR  -AS      Reps 4 25  -AS      Additional Comments Red  -AS      Exercise 5    Exercise Name 5 Shoulder ER  -AS      Reps 5 25  -AS      Additional Comments Red  -AS        User Key  (r) = Recorded By, (t) = Taken By, (c) = Cosigned By    Initials Name Provider Type    AS Tee Titus, PT Physical Therapist                                            Time Calculation:   Start Time: 0700  Stop Time: 0757  Time Calculation (min): 57 min    Therapy Charges for Today     Code Description Service Date Service Provider Modifiers Qty    05847707343  PT MANUAL THERAPY EA 15 MIN 2/5/2018 Tee Titus, PT GP 1                    Tee Titus, PT  2/5/2018

## 2018-02-08 ENCOUNTER — HOSPITAL ENCOUNTER (OUTPATIENT)
Dept: PHYSICAL THERAPY | Facility: HOSPITAL | Age: 63
Setting detail: THERAPIES SERIES
Discharge: HOME | End: 2018-02-08

## 2018-02-08 DIAGNOSIS — Z98.890 S/P ROTATOR CUFF REPAIR: Primary | ICD-10-CM

## 2018-02-08 PROCEDURE — 97140 MANUAL THERAPY 1/> REGIONS: CPT

## 2018-02-08 PROCEDURE — 97110 THERAPEUTIC EXERCISES: CPT

## 2018-02-08 NOTE — THERAPY TREATMENT NOTE
Outpatient Physical Therapy Ortho Treatment Note   Ignacia Mckeon     Patient Name: Adonis Tavares  : 1955  MRN: [id number]  Today's Date: 2018      Visit Date: 2018    Visit Dx:    ICD-10-CM ICD-9-CM   1. S/P rotator cuff repair Z98.890 V45.89       There is no problem list on file for this patient.       Past Medical History:   Diagnosis Date   • Rotator cuff injury 2017    left        Past Surgical History:   Procedure Laterality Date   • ACHILLES TENDON SURGERY Right     DR WALTER   • COLONOSCOPY  2006   • HERNIA REPAIR  2017    RIGHT INGUINAL AND UMBILICAL - DR ADHIKARI   • SHOULDER ARTHROSCOPY W/ ROTATOR CUFF REPAIR Left 2017    Procedure: LT SHOULDER ARTHROSCOPY WITH ROTATOR CUFF REPAIR SUBACROMIAL DECOMPRESSION BICEPS TENOTOMY;  Surgeon: MIGUELINA Barnes MD;  [place] [facility] [place] [facility] [place]:    • SHOULDER ROTATOR CUFF REPAIR Right                                   PT Assessment/Plan       18 0700       PT Assessment    Assessment Comments Continued to progress patient with scapular strengthening today without complaints. Plan to continue with additional strengthening moving forward. Patient continues to do well with PT with improved left shoulder ROM as well as improved tolerance to exercise and activity.  -AS     PT Plan    Physical Therapy Interventions (Optional Details) --   20 min of manual and 19 min of ther ex performed today  -AS     PT Plan Comments Continue with post-op protocol  -AS       User Key  (r) = Recorded By, (t) = Taken By, (c) = Cosigned By    Initials Name Provider Type    AS Tee Titus, PT Physical Therapist                Modalities       18 0700          Moist Heat    MH Applied Yes  -AS      Location Left Shoulder  -AS      Rx Minutes 10 mins  -AS      MH Prior to Rx Yes  -AS      Ice    Ice Applied Yes  -AS      Location Left Shoulder  -AS      Rx Minutes 10 mins  -AS      Ice S/P Rx Yes  -AS        User Key  (r) = Recorded  By, (t) = Taken By, (c) = Cosigned By    Initials Name Provider Type    AS Tee Titus, PT Physical Therapist                Exercises       02/08/18 0700          Subjective Comments    Subjective Comments Patient reports that his left shoulder continues to improve at this time.  -AS      Exercise 1    Exercise Name 1 3-Way Wand - Flex, ABD, ER  -AS      Reps 1 30  -AS      Time (Seconds) 1 5 sec hold each  -AS      Exercise 2    Exercise Name 2 Pulleys - Flex & ABD  -AS      Time (Minutes) 2 5 min each  -AS      Exercise 3    Exercise Name 3 Supine AROM for Flexion  -AS      Reps 3 25  -AS      Exercise 4    Exercise Name 4 Shoulder IR  -AS      Reps 4 25  -AS      Additional Comments Green  -AS      Exercise 5    Exercise Name 5 Shoulder ER  -AS      Reps 5 25  -AS      Additional Comments Green  -AS      Exercise 6    Exercise Name 6 Shoulder IR  -AS      Reps 6 25  -AS      Additional Comments Blue  -AS      Exercise 7    Exercise Name 7 Shoulder ER  -AS      Reps 7 25  -AS      Additional Comments Blue  -AS        User Key  (r) = Recorded By, (t) = Taken By, (c) = Cosigned By    Initials Name Provider Type    AS Tee Titus, PT Physical Therapist                                            Time Calculation:   Start Time: 0700  Stop Time: 0759  Time Calculation (min): 59 min    Therapy Charges for Today     Code Description Service Date Service Provider Modifiers Qty    [id number]  PT THER PROC EA 15 MIN 2/8/2018 Tee Titus, PT GP 1    [id number]  PT MANUAL THERAPY EA 15 MIN 2/8/2018 Tee Titus, PT GP 1                    Tee Titus, PT  2/8/2018

## 2018-02-13 ENCOUNTER — HOSPITAL ENCOUNTER (OUTPATIENT)
Dept: PHYSICAL THERAPY | Facility: HOSPITAL | Age: 63
Setting detail: THERAPIES SERIES
Discharge: HOME | End: 2018-02-13

## 2018-02-13 DIAGNOSIS — Z98.890 S/P ROTATOR CUFF REPAIR: Primary | ICD-10-CM

## 2018-02-13 PROCEDURE — 97140 MANUAL THERAPY 1/> REGIONS: CPT

## 2018-02-13 PROCEDURE — 97110 THERAPEUTIC EXERCISES: CPT

## 2018-02-13 NOTE — THERAPY TREATMENT NOTE
Outpatient Physical Therapy Ortho Treatment Note   Ignacia Mckeon     Patient Name: Adonis Tavares  : 1955  MRN: [id number]  Today's Date: 2018      Visit Date: 2018    Visit Dx:    ICD-10-CM ICD-9-CM   1. S/P rotator cuff repair Z98.890 V45.89       There is no problem list on file for this patient.       Past Medical History:   Diagnosis Date   • Rotator cuff injury 2017    left        Past Surgical History:   Procedure Laterality Date   • ACHILLES TENDON SURGERY Right     DR WALTER   • COLONOSCOPY  2006   • HERNIA REPAIR  2017    RIGHT INGUINAL AND UMBILICAL - DR ADHIKARI   • SHOULDER ARTHROSCOPY W/ ROTATOR CUFF REPAIR Left 2017    Procedure: LT SHOULDER ARTHROSCOPY WITH ROTATOR CUFF REPAIR SUBACROMIAL DECOMPRESSION BICEPS TENOTOMY;  Surgeon: MIGUELINA Barnes MD;  Location: [facility] OR [facility] [place];  Service:    • SHOULDER ROTATOR CUFF REPAIR Right                                   PT Assessment/Plan       18 0700       PT Assessment    Assessment Comments Patient continues to demonstrate improved tolerance to exercises as well as improved left shoulder PROM and AROM.  -AS     PT Plan    Physical Therapy Interventions (Optional Details) --   15 min manual therapy & 23 min of ther ex  -AS     PT Plan Comments Continue with post-op protocol  -AS       User Key  (r) = Recorded By, (t) = Taken By, (c) = Cosigned By    Initials Name Provider Type    AS Tee Titus, PT Physical Therapist                Modalities       18 0700          Moist Heat    MH Applied Yes  -AS      Location Left Shoulder  -AS      Rx Minutes 10 mins  -AS      MH Prior to Rx Yes  -AS      Ice    Ice Applied Yes  -AS      Location Left Shoulder  -AS      Rx Minutes 10 mins  -AS      Ice S/P Rx Yes  -AS        User Key  (r) = Recorded By, (t) = Taken By, (c) = Cosigned By    Initials Name Provider Type    AS Tee Titus, PT Physical Therapist                Exercises       18 0700           Subjective Comments    Subjective Comments Patient states that his shoulder continues to do well and feel pretty good at this time.  -AS      Exercise 1    Exercise Name 1 3-Way Wand - Flex, ABD, ER  -AS      Reps 1 30  -AS      Time (Seconds) 1 5 sec hold each  -AS      Exercise 2    Exercise Name 2 Pulleys - Flex & ABD  -AS      Time (Minutes) 2 5 min each  -AS      Exercise 3    Exercise Name 3 Supine AROM for Flexion  -AS      Reps 3 25  -AS      Exercise 4    Exercise Name 4 Shoulder IR  -AS      Reps 4 30  -AS      Additional Comments Blue  -AS      Exercise 5    Exercise Name 5 Shoulder ER  -AS      Reps 5 30  -AS      Additional Comments Blue  -AS      Exercise 6    Exercise Name 6 Rows  -AS      Reps 6 30  -AS      Additional Comments Blue  -AS      Exercise 7    Exercise Name 7 Extensions  -AS      Reps 7 30  -AS      Additional Comments Blue  -AS      Exercise 8    Exercise Name 8 Empty/Full Can  -AS      Reps 8 25  -AS        User Key  (r) = Recorded By, (t) = Taken By, (c) = Cosigned By    Initials Name Provider Type    AS Tee Titus, PT Physical Therapist                                            Time Calculation:   Start Time: 0700  Stop Time: 0801  Time Calculation (min): 61 min    Therapy Charges for Today     Code Description Service Date Service Provider Modifiers Qty    [id number]  PT THER PROC EA 15 MIN 2/13/2018 Tee Titus, PT GP 1    [id number]  PT MANUAL THERAPY EA 15 MIN 2/13/2018 Tee Titus, PT GP 1                    Tee Titus, PT  2/13/2018

## 2018-02-15 ENCOUNTER — HOSPITAL ENCOUNTER (OUTPATIENT)
Dept: PHYSICAL THERAPY | Facility: HOSPITAL | Age: 63
Setting detail: THERAPIES SERIES
Discharge: HOME | End: 2018-02-15

## 2018-02-15 DIAGNOSIS — Z98.890 S/P ROTATOR CUFF REPAIR: Primary | ICD-10-CM

## 2018-02-15 PROCEDURE — 97110 THERAPEUTIC EXERCISES: CPT

## 2018-02-15 PROCEDURE — 97140 MANUAL THERAPY 1/> REGIONS: CPT

## 2018-02-15 NOTE — THERAPY TREATMENT NOTE
Outpatient Physical Therapy Ortho Treatment Note   Ignacia Mckeon     Patient Name: Adonis Tavares  : 1955  MRN: [id number]  Today's Date: 2/15/2018      Visit Date: 02/15/2018    Visit Dx:    ICD-10-CM ICD-9-CM   1. S/P rotator cuff repair Z98.890 V45.89       There is no problem list on file for this patient.       Past Medical History:   Diagnosis Date   • Rotator cuff injury 2017    left        Past Surgical History:   Procedure Laterality Date   • ACHILLES TENDON SURGERY Right     DR WALTER   • COLONOSCOPY  2006   • HERNIA REPAIR  2017    RIGHT INGUINAL AND UMBILICAL - DR ADHIKARI   • SHOULDER ARTHROSCOPY W/ ROTATOR CUFF REPAIR Left 2017    Procedure: LT SHOULDER ARTHROSCOPY WITH ROTATOR CUFF REPAIR SUBACROMIAL DECOMPRESSION BICEPS TENOTOMY;  Surgeon: MIGUELINA Barnes MD;  Location: [facility] OR [facility] [place];  Service:    • SHOULDER ROTATOR CUFF REPAIR Right                                   PT Assessment/Plan       02/15/18 0700       PT Assessment    Assessment Comments Continued to progress patient with RC and scapular strengthening today without complaints of increased left shoulder pain or discomfort. Patient continues to progress well with PT with improved left shoulder ROM, strength, and increased tolerance to exercises.  -AS     PT Plan    Physical Therapy Interventions (Optional Details) --   15 min manual & 26 min ther ex performed today  -AS     PT Plan Comments Continue with post-op protocol  -AS       User Key  (r) = Recorded By, (t) = Taken By, (c) = Cosigned By    Initials Name Provider Type    AS Tee Titus, PT Physical Therapist                Modalities       02/15/18 0700          Moist Heat    MH Applied Yes  -AS      Location Left Shoulder  -AS      Rx Minutes 10 mins  -AS      MH Prior to Rx Yes  -AS      Ice    Ice Applied Yes  -AS      Location Left Shoulder  -AS      Rx Minutes 10 mins  -AS      Ice S/P Rx Yes  -AS        User Key  (r) = Recorded By, (t) = Taken  By, (c) = Cosigned By    Initials Name Provider Type    AS Tee Titus, PT Physical Therapist                Exercises       02/15/18 0700          Subjective Comments    Subjective Comments Patient states that his shoulder is sore from the new strengthening exercises that he started at his last visit. HE states that his shoulder is feeling pretty good though.  -AS      Exercise 1    Exercise Name 1 3-Way Wand - Flex, ABD, ER  -AS      Reps 1 30  -AS      Time (Seconds) 1 5 sec hold each  -AS      Exercise 2    Exercise Name 2 Pulleys - Flex & ABD  -AS      Time (Minutes) 2 5 min each  -AS      Exercise 3    Exercise Name 3 Supine AROM for Flexion  -AS      Reps 3 25  -AS      Exercise 4    Exercise Name 4 Shoulder IR  -AS      Reps 4 30  -AS      Additional Comments Blue  -AS      Exercise 5    Exercise Name 5 Shoulder ER  -AS      Reps 5 30  -AS      Additional Comments Blue  -AS      Exercise 6    Exercise Name 6 Rows  -AS      Reps 6 30  -AS      Additional Comments Blue  -AS      Exercise 7    Exercise Name 7 Extensions  -AS      Reps 7 30  -AS      Additional Comments Blue  -AS      Exercise 8    Exercise Name 8 Empty/Full Can  -AS      Reps 8 30  -AS      Exercise 9    Exercise Name 9 Serratus Punches  -AS      Reps 9 25  -AS      Exercise 10    Exercise Name 10 Sidelying ER  -AS      Reps 10 25  -AS      Exercise 11    Exercise Name 11 Prone I, Y, T  -AS      Reps 11 25  -AS        User Key  (r) = Recorded By, (t) = Taken By, (c) = Cosigned By    Initials Name Provider Type    AS Tee Titus, PT Physical Therapist                        Manual Rx (last 36 hours)      Manual Treatments       02/15/18 0700          Manual Rx 1    Manual Rx 1 Location Left Shoulder  -AS      Manual Rx 1 Type PROM - ER, IR, Flex, and ABD  -AS      Manual Rx 1 Grade Grade I/II  -AS      Manual Rx 1 Duration 15 min  -AS        User Key  (r) = Recorded By, (t) = Taken By, (c) = Cosigned By    Initials Name  Provider Type    AS Tee Titus, PT Physical Therapist                             Time Calculation:   Start Time: 0656  Stop Time: 0757  Time Calculation (min): 61 min    Therapy Charges for Today     Code Description Service Date Service Provider Modifiers Qty    [id number]  PT THER PROC EA 15 MIN 2/15/2018 Tee Titus, PT GP 1    29988228092 HC PT MANUAL THERAPY EA 15 MIN 2/15/2018 Tee Titus, PT GP 1                    Tee Titus, PT  2/15/2018

## 2018-02-19 ENCOUNTER — HOSPITAL ENCOUNTER (OUTPATIENT)
Dept: PHYSICAL THERAPY | Facility: HOSPITAL | Age: 63
Setting detail: THERAPIES SERIES
Discharge: HOME | End: 2018-02-19

## 2018-02-19 DIAGNOSIS — Z98.890 S/P ROTATOR CUFF REPAIR: Primary | ICD-10-CM

## 2018-02-19 PROCEDURE — 97110 THERAPEUTIC EXERCISES: CPT

## 2018-02-19 PROCEDURE — 97140 MANUAL THERAPY 1/> REGIONS: CPT

## 2018-02-19 NOTE — THERAPY TREATMENT NOTE
Outpatient Physical Therapy Ortho Treatment Note   Ignacia Mckeon     Patient Name: Adonis Tavares  : 1955  MRN: [id number]  Today's Date: 2018      Visit Date: 2018    Visit Dx:    ICD-10-CM ICD-9-CM   1. S/P rotator cuff repair Z98.890 V45.89       There is no problem list on file for this patient.       Past Medical History:   Diagnosis Date   • Rotator cuff injury 2017    left        Past Surgical History:   Procedure Laterality Date   • ACHILLES TENDON SURGERY Right     DR WALTER   • COLONOSCOPY  2006   • HERNIA REPAIR  2017    RIGHT INGUINAL AND UMBILICAL - DR ADHIKARI   • SHOULDER ARTHROSCOPY W/ ROTATOR CUFF REPAIR Left 2017    Procedure: LT SHOULDER ARTHROSCOPY WITH ROTATOR CUFF REPAIR SUBACROMIAL DECOMPRESSION BICEPS TENOTOMY;  Surgeon: MIGUELINA Barnes MD;  [place] [facility] [place] [facility] [place]:    • SHOULDER ROTATOR CUFF REPAIR Right                                   PT Assessment/Plan       18 0700       PT Assessment    Assessment Comments Patient continues to do well with PT at this time with reports of improved function, improved AROM, and improved strength with reports of little to no pain in left shoulder.  -AS     PT Plan    Physical Therapy Interventions (Optional Details) --   15 min of manual therapy & 27 min of ther ex   -AS     PT Plan Comments Continue with post-op protocol  -AS       User Key  (r) = Recorded By, (t) = Taken By, (c) = Cosigned By    Initials Name Provider Type    AS Tee Titus, PT Physical Therapist                Modalities       18 0700          Moist Heat    MH Applied Yes  -AS      Location Left Shoulder  -AS      Rx Minutes 10 mins  -AS      MH Prior to Rx Yes  -AS      Ice    Ice Applied Yes  -AS      Location Left Shoulder  -AS      Rx Minutes 10 mins  -AS      Ice S/P Rx Yes  -AS        User Key  (r) = Recorded By, (t) = Taken By, (c) = Cosigned By    Initials Name Provider Type    AS Tee Titus PT  Physical Therapist                Exercises       02/19/18 0700          Subjective Comments    Subjective Comments Patient states that he feels his shoulder is getting stronger and with more ROM.  -AS      Exercise 1    Exercise Name 1 3-Way Wand - Flex, ABD, ER  -AS      Reps 1 30  -AS      Time (Seconds) 1 5 sec hold each  -AS      Exercise 2    Exercise Name 2 Pulleys - Flex & ABD  -AS      Time (Minutes) 2 5 min each  -AS      Exercise 3    Exercise Name 3 Supine AROM for Flexion  -AS      Reps 3 25  -AS      Exercise 4    Exercise Name 4 Shoulder IR  -AS      Reps 4 Other   40  -AS      Additional Comments Blue  -AS      Exercise 5    Exercise Name 5 Shoulder ER  -AS      Reps 5 Other   40  -AS      Additional Comments Blue  -AS      Exercise 6    Exercise Name 6 Rows  -AS      Reps 6 Other   40  -AS      Additional Comments Blue  -AS      Exercise 7    Exercise Name 7 Extensions  -AS      Reps 7 Other   40  -AS      Additional Comments Blue  -AS      Exercise 8    Exercise Name 8 Empty/Full Can  -AS      Reps 8 Other   40  -AS      Exercise 9    Exercise Name 9 Serratus Punches  -AS      Reps 9 30  -AS      Exercise 10    Exercise Name 10 Sidelying ER  -AS      Reps 10 30  -AS      Exercise 11    Exercise Name 11 Prone I, Y, T  -AS      Reps 11 30  -AS        User Key  (r) = Recorded By, (t) = Taken By, (c) = Cosigned By    Initials Name Provider Type    AS Tee Titus, PT Physical Therapist                        Manual Rx (last 36 hours)      Manual Treatments       02/19/18 0700          Manual Rx 1    Manual Rx 1 Location Left Shoulder  -AS      Manual Rx 1 Type PROM - ER, IR, Flex, and ABD  -AS      Manual Rx 1 Grade Grade I/II  -AS      Manual Rx 1 Duration 15 min  -AS        User Key  (r) = Recorded By, (t) = Taken By, (c) = Cosigned By    Initials Name Provider Type    AS Tee Titus, PT Physical Therapist                             Time Calculation:   Start Time: 0658  Stop Time:  0802  Time Calculation (min): 64 min    Therapy Charges for Today     Code Description Service Date Service Provider Modifiers Qty    58283179890 HC PT THER PROC EA 15 MIN 2/19/2018 Tee Titus, PT GP 2    [id number]  PT MANUAL THERAPY EA 15 MIN 2/19/2018 Tee Titus, PT GP 1                    Tee Titus, PT  2/19/2018

## 2018-02-22 ENCOUNTER — HOSPITAL ENCOUNTER (OUTPATIENT)
Dept: PHYSICAL THERAPY | Facility: HOSPITAL | Age: 63
Setting detail: THERAPIES SERIES
Discharge: HOME | End: 2018-02-22

## 2018-02-22 DIAGNOSIS — Z98.890 S/P ROTATOR CUFF REPAIR: Primary | ICD-10-CM

## 2018-02-22 PROCEDURE — 97110 THERAPEUTIC EXERCISES: CPT

## 2018-02-22 PROCEDURE — 97140 MANUAL THERAPY 1/> REGIONS: CPT

## 2018-02-22 NOTE — THERAPY RE-EVALUATION
Outpatient Physical Therapy Ortho Re-Evaluation   [place]     Patient Name: Adonis Tavares  : 1955  MRN: [id number]  Today's Date: 2018      Visit Date: 2018    There is no problem list on file for this patient.       Past Medical History:   Diagnosis Date   • Rotator cuff injury 2017    left        Past Surgical History:   Procedure Laterality Date   • ACHILLES TENDON SURGERY Right     DR WALTER   • COLONOSCOPY  2006   • HERNIA REPAIR  2017    RIGHT INGUINAL AND UMBILICAL - DR ADHIKARI   • SHOULDER ARTHROSCOPY W/ ROTATOR CUFF REPAIR Left 2017    Procedure: LT SHOULDER ARTHROSCOPY WITH ROTATOR CUFF REPAIR SUBACROMIAL DECOMPRESSION BICEPS TENOTOMY;  Surgeon: MIGUELINA Barnes MD;  Location: [facility] OR [facility] [place];  Service:    • SHOULDER ROTATOR CUFF REPAIR Right             Visit Dx:     ICD-10-CM ICD-9-CM   1. S/P rotator cuff repair Z98.890 V45.89                 PT Ortho       18 0800    Precautions and Contraindications    Precautions/Limitations no known precautions/limitations  -AS    Posture/Observations    Posture- WNL Posture is WNL  -AS    Right Shoulder    Flexion AROM Deficit 165  -AS    Flexion PROM Deficit 180  -AS    ABduction AROM Deficit 169  -AS    ABduction PROM Deficit 180  -AS    External Rotation AROM Deficit WNL  -AS    External Rotation PROM Deficit WNL  -AS    Internal Rotation AROM Deficit WNL  -AS    Internal Rotation PROM Deficit WNL  -AS    Left Elbow/Forearm    Extension/Flexion AROM WNL (0-180 degrees)  -AS    Left Shoulder    Flexion Gross Movement (4-/5) good minus  -AS    ABduction Gross Movement (4-/5) good minus  -AS    Abduction Supraspinatus (4-/5) good minus  -AS    Int Rotation Gross Movement (4/5) good  -AS    Ext Rotation Gross Movement (4-/5) good minus  -AS    Left Elbow/Forearm    Elbow Flexion Gross Movement (5/5) normal  -AS    Elbow Extension Gross Movement (5/5) normal  -AS    Left Wrist    Wrist Flexion Gross Movement (5/5)  normal  -AS    Wrist Extension Gross Movement (5/5) normal  -AS      User Key  (r) = Recorded By, (t) = Taken By, (c) = Cosigned By    Initials Name Provider Type    AS Tee Titus, PT Physical Therapist                                  PT OP Goals       02/22/18 0800       PT Short Term Goals    STG Date to Achieve 03/15/18  -AS     STG 1 Patient to be compliant with his initial HEP for left shoulder ROM and strength.  -AS     STG 1 Progress Met  -AS     STG 2 Patient to report left shoulder pain on VAS of 2-3/10 with PROM.  -AS     STG 2 Progress Met  -AS     STG 3 Patient to demonstrate improved left shoulder PROM to within 15 degrees of his contralateral shoulder.  -AS     STG 3 Progress Met  -AS     STG 4 Patient to demonstrate general left shoulder strength of 4-/5.  -AS     STG 4 Progress Met  -AS     Long Term Goals    LTG Date to Achieve 04/05/18  -AS     LTG 1 Patient to be compliant with his advanced HEP for left shoulder ROM and strength.  -AS     LTG 1 Progress Met  -AS     LTG 2 Patient to report left shoulder pain on VAS of 0-1/10 with PROM and 1-2/10 with AROM.  -AS     LTG 2 Progress Met  -AS     LTG 3 Patient to demonstrate improved left shoulder PROM to WNL in all planes of motion.  -AS     LTG 3 Progress Met  -AS     LTG 4 Patient to demonstrate improved left shoulder AROM to WNL in all planes of motion.  -AS     LTG 4 Progress Partially Met;Ongoing;Progressing  -AS     LTG 5 Patient to demonstrate general left shoulder strength of 4/5.  -AS     LTG 5 Progress Ongoing;Progressing  -AS     LTG 6 Patient to report improved function and decreased left shoulder pain on Quick DASH by >10-15 points.  -AS     LTG 6 Progress Ongoing;Progressing  -AS     Time Calculation    PT Goal Re-Cert Due Date 03/22/18  -AS       User Key  (r) = Recorded By, (t) = Taken By, (c) = Cosigned By    Initials Name Provider Type    AS Tee Titus, PT Physical Therapist                PT Assessment/Plan        02/22/18 0800       PT Assessment    Functional Limitations Performance in sport activities  -AS     Impairments Muscle strength;Pain;Range of motion  -AS     Assessment Comments Patient has progressed very well with PT at this time. Patient is nearing normal AROM for his left shoulder. He demonstrates improved left shoulder strength in all planes, reports improved function and decreased pain with activities.   -AS     Please refer to paper survey for additional self-reported information Yes  -AS     Rehab Potential Good  -AS     Patient/caregiver participated in establishment of treatment plan and goals Yes  -AS     Patient would benefit from skilled therapy intervention Yes  -AS     PT Plan    PT Frequency 1x/week;2x/week  -AS     Predicted Duration of Therapy Intervention (days/wks) 4-6 weeks  -AS     Planned CPT's? PT RE-EVAL: 35806;PT THER PROC EA 15 MIN: 10881;PT THER ACT EA 15 MIN: 35368;PT MANUAL THERAPY EA 15 MIN: 34183;PT ELECTRICAL STIM UNATTEND: ;PT ULTRASOUND EA 15 MIN: 70067;PT HOT/COLD PACK WC NONMCARE: 80543;PT IONTOPHORESIS EA 15 MIN: 22932  -AS     Physical Therapy Interventions (Optional Details) --   15 min of manual & 22 min of ther ex performed today  -AS       User Key  (r) = Recorded By, (t) = Taken By, (c) = Cosigned By    Initials Name Provider Type    AS Tee Titus, PT Physical Therapist                Modalities       02/22/18 0800          Moist Heat    MH Applied Yes  -AS      Location Left Shoulder  -AS      Rx Minutes 10 mins  -AS      MH Prior to Rx Yes  -AS      Ice    Ice Applied Yes  -AS      Location Left Shoulder  -AS      Rx Minutes 10 mins  -AS      Ice S/P Rx Yes  -AS        User Key  (r) = Recorded By, (t) = Taken By, (c) = Cosigned By    Initials Name Provider Type    AS Tee Titus, PT Physical Therapist              Exercises       02/22/18 0800          Subjective Comments    Subjective Comments Patient reports that his shoulder continues to  feel good at this time.  -AS      Exercise 1    Exercise Name 1 3-Way Wand - Flex, ABD, ER  -AS      Reps 1 30  -AS      Time (Seconds) 1 5 sec hold each  -AS      Exercise 2    Exercise Name 2 Pulleys - Flex & ABD  -AS      Time (Minutes) 2 5 min each  -AS      Exercise 3    Exercise Name 3 Supine AROM for Flexion  -AS      Reps 3 30  -AS      Exercise 4    Exercise Name 4 Shoulder IR  -AS      Reps 4 25  -AS      Additional Comments Black  -AS      Exercise 5    Exercise Name 5 Shoulder ER  -AS      Reps 5 25  -AS      Additional Comments Black  -AS      Exercise 6    Exercise Name 6 Rows  -AS      Reps 6 25  -AS      Additional Comments Black  -AS      Exercise 7    Exercise Name 7 Extensions  -AS      Reps 7 25  -AS      Additional Comments Black  -AS      Exercise 8    Exercise Name 8 Empty/Full Can  -AS      Reps 8 Other   40  -AS      Exercise 9    Exercise Name 9 Serratus Punches  -AS      Reps 9 30  -AS      Exercise 10    Exercise Name 10 Sidelying ER  -AS      Reps 10 30  -AS      Exercise 11    Exercise Name 11 Prone I, Y, T  -AS      Reps 11 30  -AS        User Key  (r) = Recorded By, (t) = Taken By, (c) = Cosigned By    Initials Name Provider Type    AS Tee Titus PT Physical Therapist           Manual Rx (last 36 hours)      Manual Treatments       02/22/18 0800          Manual Rx 1    Manual Rx 1 Location Left Shoulder  -AS      Manual Rx 1 Type PROM - ER, IR, Flex, and ABD  -AS      Manual Rx 1 Grade Grade I/II  -AS      Manual Rx 1 Duration 15 min  -AS        User Key  (r) = Recorded By, (t) = Taken By, (c) = Cosigned By    Initials Name Provider Type    AS Tee Titus, SANDIE Physical Therapist                                Time Calculation:   Start Time: 0659  Stop Time: 0756  Time Calculation (min): 57 min     Therapy Charges for Today     Code Description Service Date Service Provider Modifiers Qty    82803952505  PT THER PROC EA 15 MIN 2/22/2018 Tee Titus, PT GP 1     [id number]  PT MANUAL THERAPY EA 15 MIN 2/22/2018 Tee Titus, PT GP 1                    Tee Titus, PT  2/22/2018

## 2018-02-26 ENCOUNTER — HOSPITAL ENCOUNTER (OUTPATIENT)
Dept: PHYSICAL THERAPY | Facility: HOSPITAL | Age: 63
Setting detail: THERAPIES SERIES
Discharge: HOME | End: 2018-02-26

## 2018-02-26 DIAGNOSIS — Z98.890 S/P ROTATOR CUFF REPAIR: Primary | ICD-10-CM

## 2018-02-26 PROCEDURE — 97110 THERAPEUTIC EXERCISES: CPT

## 2018-02-26 NOTE — THERAPY TREATMENT NOTE
Outpatient Physical Therapy Ortho Treatment Note   [facility]     Patient Name: Adonis Tavares  : 1955  MRN: [id number]  Today's Date: 2018      Visit Date: 2018    Visit Dx:    ICD-10-CM ICD-9-CM   1. S/P rotator cuff repair Z98.890 V45.89       There is no problem list on file for this patient.       Past Medical History:   Diagnosis Date   • Rotator cuff injury 2017    left        Past Surgical History:   Procedure Laterality Date   • ACHILLES TENDON SURGERY Right     DR WALTER   • COLONOSCOPY  2006   • HERNIA REPAIR  2017    RIGHT INGUINAL AND UMBILICAL - DR ADHIKARI   • SHOULDER ARTHROSCOPY W/ ROTATOR CUFF REPAIR Left 2017    Procedure: LT SHOULDER ARTHROSCOPY WITH ROTATOR CUFF REPAIR SUBACROMIAL DECOMPRESSION BICEPS TENOTOMY;  Surgeon: MIGUELINA Barnes MD;  [place] [facility] [place] [facility] [place]  Service:    • SHOULDER ROTATOR CUFF REPAIR Right                                   PT Assessment/Plan       18 0700       PT Assessment    Assessment Comments Patient continues to do well with PT at this time with reports of improved function, improved AROM, and improved strength with reports of little to no pain in left shoulder.  -AS     PT Plan    Physical Therapy Interventions (Optional Details) --   10 min of manual and 31 min of ther ex performed today  -AS     PT Plan Comments Continue with post-op protocol  -AS       User Key  (r) = Recorded By, (t) = Taken By, (c) = Cosigned By    Initials Name Provider Type    AS Tee Titus, PT Physical Therapist                Modalities       18 0700          Moist Heat    MH Applied Yes  -AS      Location Left Shoulder  -AS      Rx Minutes 10 mins  -AS      MH Prior to Rx Yes  -AS      Ice    Ice Applied Yes  -AS      Location Left Shoulder  -AS      Rx Minutes 10 mins  -AS      Ice S/P Rx Yes  -AS        User Key  (r) = Recorded By, (t) = Taken By, (c) = Cosigned By    Initials Name Provider Type    AS Tee Titus,  PT Physical Therapist                Exercises       02/26/18 0700          Subjective Comments    Subjective Comments Patient states that his shoulder is feeling good and that he has gone to the gym twice this past week without increased pain or discomfort. Patient states that he returns to see his MD on Monday of next week.  -AS      Exercise 1    Exercise Name 1 3-Way Wand - Flex, ABD, ER  -AS      Reps 1 30  -AS      Time (Seconds) 1 5 sec hold each  -AS      Exercise 2    Exercise Name 2 Pulleys - Flex & ABD  -AS      Time (Minutes) 2 5 min each  -AS      Exercise 3    Exercise Name 3 Supine AROM for Flexion  -AS      Reps 3 30  -AS      Exercise 4    Exercise Name 4 Shoulder IR  -AS      Reps 4 25  -AS      Additional Comments Silver  -AS      Exercise 5    Exercise Name 5 Shoulder ER  -AS      Reps 5 25  -AS      Additional Comments Silver  -AS      Exercise 6    Exercise Name 6 Rows  -AS      Reps 6 25  -AS      Additional Comments Silver  -AS      Exercise 7    Exercise Name 7 Extensions  -AS      Reps 7 25  -AS      Additional Comments Silver  -AS      Exercise 8    Exercise Name 8 Empty/Full Can  -AS      Reps 8 Other   40  -AS      Additional Comments 1#  -AS      Exercise 9    Exercise Name 9 Serratus Punches  -AS      Reps 9 30  -AS      Additional Comments 2#  -AS      Exercise 10    Exercise Name 10 Sidelying ER  -AS      Reps 10 30  -AS      Additional Comments 2#  -AS      Exercise 11    Exercise Name 11 Prone I, Y, T  -AS      Reps 11 25  -AS      Additional Comments 1#  -AS        User Key  (r) = Recorded By, (t) = Taken By, (c) = Cosigned By    Initials Name Provider Type    AS Tee Titus, PT Physical Therapist                        Manual Rx (last 36 hours)      Manual Treatments       02/26/18 0700          Manual Rx 1    Manual Rx 1 Location Left Shoulder  -AS      Manual Rx 1 Type PROM - ER, IR, Flex, and ABD  -AS      Manual Rx 1 Grade Grade I/II  -AS      Manual Rx 1 Duration 10  min  -AS        User Key  (r) = Recorded By, (t) = Taken By, (c) = Cosigned By    Initials Name Provider Type    AS Tee Titus, PT Physical Therapist                             Time Calculation:   Start Time: 0656  Stop Time: 0757  Time Calculation (min): 61 min    Therapy Charges for Today     Code Description Service Date Service Provider Modifiers Qty    [id number]  PT THER PROC EA 15 MIN 2/26/2018 Tee Titus, PT GP 2                    Tee Titus, PT  2/26/2018

## 2018-03-01 ENCOUNTER — HOSPITAL ENCOUNTER (OUTPATIENT)
Dept: PHYSICAL THERAPY | Facility: HOSPITAL | Age: 63
Setting detail: THERAPIES SERIES
Discharge: HOME | End: 2018-03-01

## 2018-03-01 DIAGNOSIS — Z98.890 S/P ROTATOR CUFF REPAIR: Primary | ICD-10-CM

## 2018-03-01 PROCEDURE — 97110 THERAPEUTIC EXERCISES: CPT

## 2018-03-01 NOTE — THERAPY TREATMENT NOTE
Outpatient Physical Therapy Ortho Treatment Note   [facility]     Patient Name: Adonis Tavares  : 1955  MRN: [id number]  Today's Date: 3/1/2018      Visit Date: 2018    Visit Dx:    ICD-10-CM ICD-9-CM   1. S/P rotator cuff repair Z98.890 V45.89       There is no problem list on file for this patient.       Past Medical History:   Diagnosis Date   • Rotator cuff injury 2017    left        Past Surgical History:   Procedure Laterality Date   • ACHILLES TENDON SURGERY Right     DR WALTER   • COLONOSCOPY  2006   • HERNIA REPAIR  2017    RIGHT INGUINAL AND UMBILICAL - DR ADHIKARI   • SHOULDER ARTHROSCOPY W/ ROTATOR CUFF REPAIR Left 2017    Procedure: LT SHOULDER ARTHROSCOPY WITH ROTATOR CUFF REPAIR SUBACROMIAL DECOMPRESSION BICEPS TENOTOMY;  Surgeon: MIGUELINA Barnes MD;  Location: [facility] OR [facility] [place];  Service:    • SHOULDER ROTATOR CUFF REPAIR Right                                   PT Assessment/Plan       18 0900       PT Assessment    Assessment Comments Patient continues to demonstrate improved tolerance to exercises as well as improved left shoulder strength and AROM.  -AS     PT Plan    Physical Therapy Interventions (Optional Details) --   43 min of ther ex performed today  -AS     PT Plan Comments Continue with post-op protocol  -AS       User Key  (r) = Recorded By, (t) = Taken By, (c) = Cosigned By    Initials Name Provider Type    AS Tee Titus, PT Physical Therapist                Modalities       18 0900          Moist Heat    MH Applied Yes  -AS      Location Left Shoulder  -AS      Rx Minutes 10 mins  -AS      MH Prior to Rx Yes  -AS      Ice    Ice Applied Yes  -AS      Location Left Shoulder  -AS      Rx Minutes 10 mins  -AS      Ice S/P Rx Yes  -AS        User Key  (r) = Recorded By, (t) = Taken By, (c) = Cosigned By    Initials Name Provider Type    AS Tee Titus, PT Physical Therapist                Exercises       18 09           Subjective Comments    Subjective Comments Patient states his shoulder is feeling good and he reports that he is scheduled to see his MD this next Monday.  -AS      Exercise 1    Exercise Name 1 3-Way Wand - Flex, ABD, ER  -AS      Reps 1 30  -AS      Time (Seconds) 1 5 sec hold each  -AS      Exercise 2    Exercise Name 2 Pulleys - Flex & ABD  -AS      Time (Minutes) 2 5 min each  -AS      Exercise 3    Exercise Name 3 Supine AROM for Flexion  -AS      Reps 3 30  -AS      Exercise 4    Exercise Name 4 Shoulder IR  -AS      Reps 4 30  -AS      Additional Comments Silver  -AS      Exercise 5    Exercise Name 5 Shoulder ER  -AS      Reps 5 30  -AS      Additional Comments Silver  -AS      Exercise 6    Exercise Name 6 Rows  -AS      Reps 6 30  -AS      Additional Comments Silver  -AS      Exercise 7    Exercise Name 7 Extensions  -AS      Reps 7 30  -AS      Additional Comments Silver  -AS      Exercise 8    Exercise Name 8 Empty/Full Can  -AS      Reps 8 Other   40  -AS      Additional Comments 1#  -AS      Exercise 9    Exercise Name 9 Serratus Punches  -AS      Reps 9 Other   40  -AS      Additional Comments 2#  -AS      Exercise 10    Exercise Name 10 Sidelying ER  -AS      Reps 10 Other   40  -AS      Additional Comments 2#  -AS      Exercise 11    Exercise Name 11 Prone I, Y, T  -AS      Reps 11 30  -AS      Additional Comments 1#  -AS        User Key  (r) = Recorded By, (t) = Taken By, (c) = Cosigned By    Initials Name Provider Type    AS Tee Titus, PT Physical Therapist                        Manual Rx (last 36 hours)      Manual Treatments       03/01/18 0900          Manual Rx 1    Manual Rx 1 Location Left Shoulder  -AS      Manual Rx 1 Type PROM - ER, IR, Flex, and ABD  -AS      Manual Rx 1 Grade Grade I/II  -AS      Manual Rx 1 Duration 10 min  -AS        User Key  (r) = Recorded By, (t) = Taken By, (c) = Cosigned By    Initials Name Provider Type    AS Tee Titus, SANDIE Physical  Therapist                             Time Calculation:   Start Time: 0654  Stop Time: 0757  Time Calculation (min): 63 min    Therapy Charges for Today     Code Description Service Date Service Provider Modifiers Qty    [id number]  PT THER PROC EA 15 MIN 3/1/2018 Tee Titus, PT GP 2                    Tee Titus, PT  3/1/2018

## 2018-03-05 ENCOUNTER — HOSPITAL ENCOUNTER (OUTPATIENT)
Dept: PHYSICAL THERAPY | Facility: HOSPITAL | Age: 63
Setting detail: THERAPIES SERIES
Discharge: HOME | End: 2018-03-05

## 2018-03-05 DIAGNOSIS — Z98.890 S/P ROTATOR CUFF REPAIR: Primary | ICD-10-CM

## 2018-03-05 PROCEDURE — 97110 THERAPEUTIC EXERCISES: CPT

## 2018-03-05 NOTE — THERAPY TREATMENT NOTE
Outpatient Physical Therapy Ortho Treatment Note   [place]     Patient Name: Adonis Tavares  : 1955  MRN: [id number]  Today's Date: 3/5/2018      Visit Date: 2018    Visit Dx:    ICD-10-CM ICD-9-CM   1. S/P rotator cuff repair Z98.890 V45.89       There is no problem list on file for this patient.       Past Medical History:   Diagnosis Date   • Rotator cuff injury 2017    left        Past Surgical History:   Procedure Laterality Date   • ACHILLES TENDON SURGERY Right     DR WALTER   • COLONOSCOPY  2006   • HERNIA REPAIR  2017    RIGHT INGUINAL AND UMBILICAL - DR ADHIKARI   • SHOULDER ARTHROSCOPY W/ ROTATOR CUFF REPAIR Left 2017    Procedure: LT SHOULDER ARTHROSCOPY WITH ROTATOR CUFF REPAIR SUBACROMIAL DECOMPRESSION BICEPS TENOTOMY;  Surgeon: MIGUELINA Barnes MD;  [place] [facility] [place] [facility] [place]:    • SHOULDER ROTATOR CUFF REPAIR Right                   PT Ortho       18 0700    Right Shoulder    Flexion AROM Deficit 174  -AS    ABduction AROM Deficit 175  -AS    External Rotation AROM Deficit 90  -AS    Internal Rotation AROM Deficit 70  -AS    Left Shoulder    Flexion Gross Movement (4+/5) good plus  -AS    ABduction Gross Movement (4-/5) good minus  -AS    Abduction Supraspinatus (4-/5) good minus  -AS    Int Rotation Gross Movement (4/5) good  -AS    Ext Rotation Gross Movement (4/5) good  -AS      User Key  (r) = Recorded By, (t) = Taken By, (c) = Cosigned By    Initials Name Provider Type    AS Tee Titus, PT Physical Therapist                            PT Assessment/Plan       18 0700       PT Assessment    Assessment Comments Patient is progressing well with PT at this time. Patient reports he has little to no pain in left shoulder and reports little to no difficulty with ADL's and work related activities at this time. Patient's left shoulder AROM for Flexion = 174, ABD = 175, IR = 70, ER = 90. Patient's left shoulder IR and ER strength is 4/5,  flexion strength is 4+/5, ABD strength is 4-/5. Patient's strength and function continue to improve at this time.   -AS     PT Plan    Physical Therapy Interventions (Optional Details) --   44 min of ther ex performed today  -AS     PT Plan Comments Patient to see his MD this afternoon. Will continue with outpatient PT as advised.  -AS       User Key  (r) = Recorded By, (t) = Taken By, (c) = Cosigned By    Initials Name Provider Type    AS Tee Titus, PT Physical Therapist                Modalities       03/05/18 0700          Subjective Comments    Subjective Comments Patient states that he continues to feel well and states he is to see his MD later today.  -AS      Moist Heat    MH Applied Yes  -AS      Location Left Shoulder  -AS      Rx Minutes 10 mins  -AS      MH Prior to Rx Yes  -AS      Ice    Ice Applied Yes  -AS      Location Left Shoulder  -AS      Rx Minutes 10 mins  -AS      Ice S/P Rx Yes  -AS        User Key  (r) = Recorded By, (t) = Taken By, (c) = Cosigned By    Initials Name Provider Type    AS Tee Titus, PT Physical Therapist                Exercises       03/05/18 0700          Subjective Comments    Subjective Comments Patient states that he continues to feel well and states he is to see his MD later today.  -AS      Exercise 1    Exercise Name 1 3-Way Wand - Flex, ABD, ER  -AS      Reps 1 30  -AS      Time (Seconds) 1 5 sec hold each  -AS      Exercise 2    Exercise Name 2 Pulleys - Flex & ABD  -AS      Time (Minutes) 2 5 min each  -AS      Exercise 3    Exercise Name 3 Supine AROM for Flexion  -AS      Reps 3 30  -AS      Exercise 4    Exercise Name 4 Shoulder IR  -AS      Reps 4 30  -AS      Additional Comments Gold  -AS      Exercise 5    Exercise Name 5 Shoulder ER  -AS      Reps 5 30  -AS      Additional Comments Gold  -AS      Exercise 6    Exercise Name 6 Rows  -AS      Reps 6 30  -AS      Additional Comments Gold  -AS      Exercise 7    Exercise Name 7 Extensions   -AS      Reps 7 30  -AS      Additional Comments Gold  -AS      Exercise 8    Exercise Name 8 Empty/Full Can  -AS      Reps 8 Other   40  -AS      Additional Comments 1#  -AS      Exercise 9    Exercise Name 9 Serratus Punches  -AS      Reps 9 25  -AS      Additional Comments 3#  -AS      Exercise 10    Exercise Name 10 Sidelying ER  -AS      Reps 10 25  -AS      Additional Comments 3#  -AS      Exercise 11    Exercise Name 11 Prone I, Y, T  -AS      Reps 11 Other   40  -AS      Additional Comments 1#  -AS      Exercise 12    Exercise Name 12 Standing Flex and ABD vs Band  -AS      Reps 12 25  -AS      Additional Comments Green  -AS        User Key  (r) = Recorded By, (t) = Taken By, (c) = Cosigned By    Initials Name Provider Type    AS Tee Titus, PT Physical Therapist                                            Time Calculation:   Start Time: 0700  Stop Time: 0754  Time Calculation (min): 54 min    Therapy Charges for Today     Code Description Service Date Service Provider Modifiers Qty    [id number] HC PT THER PROC EA 15 MIN 3/5/2018 Tee Titus, PT GP 2                    Tee Titus, PT  3/5/2018

## 2018-03-08 ENCOUNTER — APPOINTMENT (OUTPATIENT)
Dept: PHYSICAL THERAPY | Facility: HOSPITAL | Age: 63
End: 2018-03-08

## 2018-03-13 ENCOUNTER — APPOINTMENT (OUTPATIENT)
Dept: PHYSICAL THERAPY | Facility: HOSPITAL | Age: 63
End: 2018-03-13

## 2020-10-21 ENCOUNTER — LAB REQUISITION (OUTPATIENT)
Dept: LAB | Facility: HOSPITAL | Age: 65
End: 2020-10-21

## 2020-10-21 DIAGNOSIS — N20.0 CALCULUS OF KIDNEY: ICD-10-CM

## 2020-10-21 PROCEDURE — 82365 CALCULUS SPECTROSCOPY: CPT | Performed by: UROLOGY

## 2020-10-29 LAB
COLOR STONE: NORMAL
COM MFR STONE: 100 %
COMPN STONE: NORMAL
LABORATORY COMMENT REPORT: NORMAL
Lab: NORMAL
Lab: NORMAL
PHOTO: NORMAL
SIZE STONE: NORMAL MM
SPEC SOURCE SUBJ: NORMAL
WT STONE: 237 MG

## (undated) DEVICE — DRAPE,U/ SHT,SPLIT,PLAS,STERIL: Brand: MEDLINE

## (undated) DEVICE — POSTN ARMSLV LAT/TRACTION DISP

## (undated) DEVICE — GLV SURG BIOGEL LTX PF 8

## (undated) DEVICE — BLD SHAVER RESEC SABRE COOLCUT 5MM 13CM

## (undated) DEVICE — SKIN PREP TRAY W/CHG: Brand: MEDLINE INDUSTRIES, INC.

## (undated) DEVICE — DRSNG WND GZ CURAD OIL EMULSION 3X3IN STRL

## (undated) DEVICE — ENCORE® LATEX ORTHO SIZE 7, STERILE LATEX POWDER-FREE SURGICAL GLOVE: Brand: ENCORE

## (undated) DEVICE — CANN TWST IN W/NOSQUIRT CAP 7IDX7CM

## (undated) DEVICE — BUR SHAVER FLUSHCUT 8FLUT 5MM 13CM

## (undated) DEVICE — Device: Brand: DRILL, 2.7MM, FOR BIOWICK™ SURELOCK™

## (undated) DEVICE — Device: Brand: QUATTRO® SUTURE PASSER NEEDLE

## (undated) DEVICE — ABL APOLLO RF M/PRT 90D

## (undated) DEVICE — GLV SURG BIOGEL LTX PF 7

## (undated) DEVICE — SUT ETHLN 4/0 PS2 PLSTC 1667G

## (undated) DEVICE — TUBING SET, GRAVITY, 4-SPIKE

## (undated) DEVICE — PK ARTHSCP SHLDR TOWER 40

## (undated) DEVICE — IMMOB SHLDR PAD2 UNIV LG

## (undated) DEVICE — GLV SURG TRIUMPH CLASSIC PF LTX 7.5 STRL